# Patient Record
Sex: MALE | Race: WHITE | HISPANIC OR LATINO | Employment: UNEMPLOYED | URBAN - METROPOLITAN AREA
[De-identification: names, ages, dates, MRNs, and addresses within clinical notes are randomized per-mention and may not be internally consistent; named-entity substitution may affect disease eponyms.]

---

## 2023-03-28 ENCOUNTER — OFFICE VISIT (OUTPATIENT)
Dept: PRIMARY CARE | Facility: CLINIC | Age: 33
End: 2023-03-28
Payer: COMMERCIAL

## 2023-03-28 VITALS
TEMPERATURE: 97.4 F | SYSTOLIC BLOOD PRESSURE: 138 MMHG | WEIGHT: 315 LBS | HEART RATE: 70 BPM | OXYGEN SATURATION: 95 % | DIASTOLIC BLOOD PRESSURE: 98 MMHG | BODY MASS INDEX: 60.76 KG/M2 | RESPIRATION RATE: 20 BRPM

## 2023-03-28 DIAGNOSIS — Z00.00 ANNUAL PHYSICAL EXAM: Primary | ICD-10-CM

## 2023-03-28 DIAGNOSIS — R05.3 CHRONIC COUGH: ICD-10-CM

## 2023-03-28 DIAGNOSIS — E66.01 MORBID OBESITY (MULTI): Chronic | ICD-10-CM

## 2023-03-28 DIAGNOSIS — M51.26 HERNIATION OF INTERVERTEBRAL DISC OF LUMBAR SPINE: ICD-10-CM

## 2023-03-28 DIAGNOSIS — Z23 NEED FOR HPV VACCINE: ICD-10-CM

## 2023-03-28 DIAGNOSIS — G47.33 OSA (OBSTRUCTIVE SLEEP APNEA): ICD-10-CM

## 2023-03-28 DIAGNOSIS — Z23 NEED FOR TDAP VACCINATION: ICD-10-CM

## 2023-03-28 PROBLEM — G57.11 MERALGIA PARESTHETICA OF RIGHT SIDE: Status: ACTIVE | Noted: 2023-03-28

## 2023-03-28 PROBLEM — Z78.9 TRANSGENDER: Status: ACTIVE | Noted: 2023-03-28

## 2023-03-28 PROBLEM — M54.50 LOW BACK PAIN: Status: ACTIVE | Noted: 2023-03-28

## 2023-03-28 PROCEDURE — 99395 PREV VISIT EST AGE 18-39: CPT | Performed by: STUDENT IN AN ORGANIZED HEALTH CARE EDUCATION/TRAINING PROGRAM

## 2023-03-28 PROCEDURE — 1036F TOBACCO NON-USER: CPT | Performed by: STUDENT IN AN ORGANIZED HEALTH CARE EDUCATION/TRAINING PROGRAM

## 2023-03-28 RX ORDER — LORATADINE 10 MG/1
10 TABLET ORAL DAILY
Qty: 30 TABLET | Refills: 11 | Status: SHIPPED | OUTPATIENT
Start: 2023-03-28 | End: 2023-05-09 | Stop reason: ALTCHOICE

## 2023-03-28 RX ORDER — TRIAMCINOLONE ACETONIDE 55 UG/1
2 SPRAY, METERED NASAL DAILY
Qty: 16.5 G | Refills: 11 | Status: SHIPPED | OUTPATIENT
Start: 2023-03-28 | End: 2023-05-09 | Stop reason: ALTCHOICE

## 2023-03-28 RX ORDER — FERROUS SULFATE 325(65) MG
1 TABLET ORAL EVERY OTHER DAY
COMMUNITY
Start: 2023-03-15

## 2023-03-28 RX ORDER — ESTRADIOL 2 MG/1
2 TABLET ORAL 2 TIMES DAILY
COMMUNITY
Start: 2023-03-17

## 2023-03-28 RX ORDER — SPIRONOLACTONE 50 MG/1
50 TABLET, FILM COATED ORAL 2 TIMES DAILY
COMMUNITY
Start: 2023-03-07

## 2023-03-28 RX ORDER — CHOLECALCIFEROL (VITAMIN D3) 25 MCG
25 TABLET ORAL DAILY
COMMUNITY
Start: 2023-02-10

## 2023-03-28 SDOH — ECONOMIC STABILITY: FOOD INSECURITY: WITHIN THE PAST 12 MONTHS, YOU WORRIED THAT YOUR FOOD WOULD RUN OUT BEFORE YOU GOT MONEY TO BUY MORE.: NEVER TRUE

## 2023-03-28 SDOH — ECONOMIC STABILITY: HOUSING INSECURITY
IN THE LAST 12 MONTHS, WAS THERE A TIME WHEN YOU DID NOT HAVE A STEADY PLACE TO SLEEP OR SLEPT IN A SHELTER (INCLUDING NOW)?: NO

## 2023-03-28 SDOH — ECONOMIC STABILITY: TRANSPORTATION INSECURITY
IN THE PAST 12 MONTHS, HAS LACK OF TRANSPORTATION KEPT YOU FROM MEETINGS, WORK, OR FROM GETTING THINGS NEEDED FOR DAILY LIVING?: YES

## 2023-03-28 SDOH — HEALTH STABILITY: PHYSICAL HEALTH: ON AVERAGE, HOW MANY MINUTES DO YOU ENGAGE IN EXERCISE AT THIS LEVEL?: 30 MIN

## 2023-03-28 SDOH — ECONOMIC STABILITY: INCOME INSECURITY: IN THE LAST 12 MONTHS, WAS THERE A TIME WHEN YOU WERE NOT ABLE TO PAY THE MORTGAGE OR RENT ON TIME?: NO

## 2023-03-28 SDOH — HEALTH STABILITY: PHYSICAL HEALTH: ON AVERAGE, HOW MANY DAYS PER WEEK DO YOU ENGAGE IN MODERATE TO STRENUOUS EXERCISE (LIKE A BRISK WALK)?: 3 DAYS

## 2023-03-28 SDOH — ECONOMIC STABILITY: FOOD INSECURITY: WITHIN THE PAST 12 MONTHS, THE FOOD YOU BOUGHT JUST DIDN'T LAST AND YOU DIDN'T HAVE MONEY TO GET MORE.: NEVER TRUE

## 2023-03-28 SDOH — ECONOMIC STABILITY: TRANSPORTATION INSECURITY
IN THE PAST 12 MONTHS, HAS THE LACK OF TRANSPORTATION KEPT YOU FROM MEDICAL APPOINTMENTS OR FROM GETTING MEDICATIONS?: YES

## 2023-03-28 ASSESSMENT — ENCOUNTER SYMPTOMS
LOSS OF SENSATION IN FEET: 0
OCCASIONAL FEELINGS OF UNSTEADINESS: 0
NERVOUS/ANXIOUS: 0
DIARRHEA: 0
UNEXPECTED WEIGHT CHANGE: 0
WEAKNESS: 0
DEPRESSION: 0
APPETITE CHANGE: 0
BACK PAIN: 1
CONSTIPATION: 0
SLEEP DISTURBANCE: 0
FREQUENCY: 0
DIFFICULTY URINATING: 0
SHORTNESS OF BREATH: 0
NUMBNESS: 1
DECREASED CONCENTRATION: 0
HYPERACTIVE: 0
FATIGUE: 0
FEVER: 0
DYSPHORIC MOOD: 0

## 2023-03-28 ASSESSMENT — LIFESTYLE VARIABLES
AUDIT-C TOTAL SCORE: 0
HOW OFTEN DO YOU HAVE SIX OR MORE DRINKS ON ONE OCCASION: NEVER
HOW OFTEN DO YOU HAVE A DRINK CONTAINING ALCOHOL: NEVER
HOW MANY STANDARD DRINKS CONTAINING ALCOHOL DO YOU HAVE ON A TYPICAL DAY: PATIENT DOES NOT DRINK
SKIP TO QUESTIONS 9-10: 1

## 2023-03-28 ASSESSMENT — PATIENT HEALTH QUESTIONNAIRE - PHQ9
SUM OF ALL RESPONSES TO PHQ9 QUESTIONS 1 & 2: 0
2. FEELING DOWN, DEPRESSED OR HOPELESS: NOT AT ALL
1. LITTLE INTEREST OR PLEASURE IN DOING THINGS: NOT AT ALL

## 2023-03-28 ASSESSMENT — SOCIAL DETERMINANTS OF HEALTH (SDOH)
WITHIN THE LAST YEAR, HAVE YOU BEEN AFRAID OF YOUR PARTNER OR EX-PARTNER?: NO
WITHIN THE LAST YEAR, HAVE YOU BEEN HUMILIATED OR EMOTIONALLY ABUSED IN OTHER WAYS BY YOUR PARTNER OR EX-PARTNER?: NO
HOW HARD IS IT FOR YOU TO PAY FOR THE VERY BASICS LIKE FOOD, HOUSING, MEDICAL CARE, AND HEATING?: SOMEWHAT HARD
ARE YOU MARRIED, WIDOWED, DIVORCED, SEPARATED, NEVER MARRIED, OR LIVING WITH A PARTNER?: NEVER MARRIED
HOW OFTEN DO YOU ATTEND CHURCH OR RELIGIOUS SERVICES?: NEVER
HOW OFTEN DO YOU GET TOGETHER WITH FRIENDS OR RELATIVES?: THREE TIMES A WEEK
WITHIN THE LAST YEAR, HAVE TO BEEN RAPED OR FORCED TO HAVE ANY KIND OF SEXUAL ACTIVITY BY YOUR PARTNER OR EX-PARTNER?: NO
WITHIN THE LAST YEAR, HAVE YOU BEEN KICKED, HIT, SLAPPED, OR OTHERWISE PHYSICALLY HURT BY YOUR PARTNER OR EX-PARTNER?: NO
IN A TYPICAL WEEK, HOW MANY TIMES DO YOU TALK ON THE PHONE WITH FAMILY, FRIENDS, OR NEIGHBORS?: THREE TIMES A WEEK
DO YOU BELONG TO ANY CLUBS OR ORGANIZATIONS SUCH AS CHURCH GROUPS UNIONS, FRATERNAL OR ATHLETIC GROUPS, OR SCHOOL GROUPS?: NO
HOW OFTEN DO YOU ATTENT MEETINGS OF THE CLUB OR ORGANIZATION YOU BELONG TO?: NEVER

## 2023-03-28 NOTE — ASSESSMENT & PLAN NOTE
Discussed extensively the need for weight loss and potential strategies to achieve that goal.  Unclear what maximum weight was, but has lost at least 8 pounds.  Recommend to continue tracking calories and limit to under 2000/day.  Gave activity recommendations.  Follow-up in 3 months.

## 2023-03-28 NOTE — PROGRESS NOTES
Subjective   Nic Horowitz is a 32 y.o. male who is here for a routine exam.    Gained weight after last visit in Nov 2022. Has lost about 20lb in the past 3 weeks intentionally.   Has sleep study pending in 2 weeks.   Has not worked since October 2021 due to back pain. Has not gotten MRI of L spine for herniated disc yet.   Sees a Psychiatrist. Was on medications for Bipolar disorder, stopped a few months ago due to feeling more tired and  having difficulty concentration on the medications.   Also has started seeing a provider for hormone therapy. Born as a male but identifies as a female. Feels satisfied with hormone therapy and feels empowered to take initiative with other health issues now.    Active Problem List      Comprehensive Medical/Surgical/Social/Family History  Past Medical History:   Diagnosis Date    Bariatric surgery status 11/08/2018    Bariatric surgery status    Bipolar disorder, unspecified (CMS/[facility]) 10/07/2021    Bipolar 1 disorder    Essential (primary) hypertension 12/21/2018    Elevated blood pressure reading in office with white coat syndrome, with diagnosis of hypertension    Morbid (severe) obesity due to excess calories (CMS/[facility]) 11/10/2021    Morbid obesity    Morbid (severe) obesity due to excess calories (CMS/[facility]) 11/10/2021    Morbid obesity    Other forms of dyspnea 12/06/2018    Exertional dyspnea    Other specified health status 10/19/2018    Known health problems: none    Other symptoms and signs involving the nervous system 10/19/2018    Suspected sleep apnea    Vitamin deficiency, unspecified 10/19/2018    Vitamin deficiency     Past Surgical History:   Procedure Laterality Date    OTHER SURGICAL HISTORY  02/18/2020    Oral surgery     Social History     Social History Narrative    Not on file         Allergies and Medications  Patient has no known allergies.  Current Outpatient Medications on File Prior to Visit   Medication Sig Dispense Refill    cholecalciferol (Vitamin  D-3) 25 MCG (1000 UT) tablet Take 1 tablet (25 mcg) by mouth once daily.      estradiol (Estrace) 2 mg tablet Take 1 tablet (2 mg) by mouth in the morning and 1 tablet (2 mg) before bedtime.      ferrous sulfate 325 (65 Fe) MG tablet Take 1 tablet (325 mg) by mouth every other day.      spironolactone (Aldactone) 50 mg tablet Take 1 tablet (50 mg) by mouth in the morning and 1 tablet (50 mg) before bedtime.       No current facility-administered medications on file prior to visit.       Cancer Screening:  Colon Cancer Screening: not indicated  Prostate Cancer Risk: screening not indicated    Immunizations: Last Tdap in 2012. Didn't get flu shot this season. Has gotten initial COVID vaccine, but not boosters.    Preventative: Doesn't see a dentist regularly, last visit July 2022. Brushes twice a day and flosses daily. Doesn't see an eye doctor regularly, needs new glasses.  Might have gotten checked for Hep C and and HIV about 6 years.   Not sexually active.    Diet: Has been been cutting back on fat and calories. Is trying to add vegetables with meals. Prepares food more often for himself, mom used to prepare food before. Uses [employer] charlette occasionally. Has about 2000 calories logged per day.   Exercise: Has been walking more. Walks indoors inside the house. Tries to go for 20-30 minutes per day.      New concerns:  Has an ongoing cough since December 2022. Was sick when it started. Usually dry but sometimes productive of clear sputum. No fevers, rhinitis, sneezing, sore throat, or watery eyes. Not improved with Mucinex. Has seasonal allergies, takes antihistamine and Flonase. Denies heartburn.     Still has ongoing intermittent pain and pins/needles sensation in R leg. Radiates from lower back. Pain gets up to 6/10 on most days. Exacerbated by walking and lifting objects. Cannot sit or stand for prolonged periods. Not taking any medications. Not having saddle anesthesia or bowel/bladder incontinence or retention.      Review of Systems   Constitutional:  Negative for appetite change, fatigue, fever and unexpected weight change.   Respiratory:  Negative for shortness of breath.    Cardiovascular:  Negative for chest pain.   Gastrointestinal:  Negative for constipation and diarrhea.   Genitourinary:  Negative for decreased urine volume, difficulty urinating, frequency and urgency.   Musculoskeletal:  Positive for back pain.   Neurological:  Positive for numbness. Negative for weakness.   Psychiatric/Behavioral:  Negative for decreased concentration, dysphoric mood, sleep disturbance and suicidal ideas. The patient is not nervous/anxious and is not hyperactive.        Objective   BP (!) 138/98   Pulse 70   Temp 36.3 °C (97.4 °F) (Temporal)   Resp 20   Wt (!) 203 kg (448 lb)   SpO2 95%   BMI 60.76 kg/m²   Constitutional: Well developed, awake, alert, oriented x3  Head and Face: NCAT  Eyes: Normal external exam, EOMI  ENT: Normal external inspection of ears and nose. Tympanic membranes clear. Oropharynx normal.  Cardiovascular: RRR, S1/S2, no murmurs, rubs, or gallops, radial pulses +2, no edema of extremities  Pulmonary: CTAB, no respiratory distress.  Abdomen: obese, soft, non-tender, nondistended, no guarding or rebound, no masses noted  MSK: R lumbar paraspinal tenderness. Decreased lumbar flexion. Negative SLR b/l. LE strength 5/5 b/l.  Neuro: Decreased sensation to light touch of R LE.  Skin: No lesions, contusions, or erythema.  Psychiatric: Judgment intact. Appropriate mood and behavior    Assessment/Plan   Encounter Diagnoses   Name Primary?    Annual physical exam Yes    Need for HPV vaccine     Need for Tdap vaccination     Herniation of intervertebral disc of lumbar spine     Chronic cough     Morbid obesity (CMS/HCC)     CHA (obstructive sleep apnea)      CHA (obstructive sleep apnea)  Has known CHA but requires updated sleep study in order to order CPAP therapy.  Sleep study is pending within the next 2 weeks.   Follow-up to be determined based on results.    Herniation of intervertebral disc of lumbar spine  Lower back pain radiating down the right leg with sensory deficits for the past 6 months.  Deficits are not worsening, strength in the lower extremity has improved.  No active saddle anesthesia, bowel or bladder incontinence/retention, or midline tenderness.  MRI is still pending.  Reordered MRI.  Recommended weight loss.  Patient also seeking note stating that he is not able to work.  Refer to OT for functional capacity evaluation.  Follow-up to be determined based on MRI results.    Morbid obesity (CMS/HCC)  Discussed extensively the need for weight loss and potential strategies to achieve that goal.  Unclear what maximum weight was, but has lost at least 8 pounds.  Recommend to continue tracking calories and limit to under 2000/day.  Gave activity recommendations.  Follow-up in 3 months.    Chronic cough  Likely related to worsening control of allergic rhinitis. Will switch from Zyrtec and Flonase to Claritin and Nasacort. Follow up in 1 month if not improving.      Reviewed Social Determinants of health with patient, discussed healthy lifestyle including 150 minutes of physical activity per week. Recommended at least 2 days of muscle-strengthening activity per week.    Reviewed routine labs. Had some done in 11/2022. Will order all routine labs at next visit in 3 months.   Immunizations Not Up-to-Date. Needs Tdap. Desires HPV. Due to insurance requirements, needs to go to [facility] for vaccines.     Follow up in 3 months.

## 2023-03-28 NOTE — PROGRESS NOTES
Darwin Horowitz is a 32 y.o. male who is here for a routine exam.  Active Problem List      Comprehensive Medical/Surgical/Social/Family History  Past Medical History:   Diagnosis Date    Bariatric surgery status 11/08/2018    Bariatric surgery status    Bipolar disorder, unspecified (CMS/[facility]) 10/07/2021    Bipolar 1 disorder    Essential (primary) hypertension 12/21/2018    Elevated blood pressure reading in office with white coat syndrome, with diagnosis of hypertension    Morbid (severe) obesity due to excess calories (CMS/[facility]) 11/10/2021    Morbid obesity    Morbid (severe) obesity due to excess calories (CMS/[facility]) 11/10/2021    Morbid obesity    Other forms of dyspnea 12/06/2018    Exertional dyspnea    Other specified health status 10/19/2018    Known health problems: none    Other symptoms and signs involving the nervous system 10/19/2018    Suspected sleep apnea    Vitamin deficiency, unspecified 10/19/2018    Vitamin deficiency     Past Surgical History:   Procedure Laterality Date    OTHER SURGICAL HISTORY  02/18/2020    Oral surgery     Social History     Social History Narrative    Not on file         Allergies and Medications  Patient has no allergy information on record.  No current outpatient medications on file prior to visit.     No current facility-administered medications on file prior to visit.       New concerns:  ***    Review of Systems    Objective   There were no vitals taken for this visit.    Physical Exam    Assessment/Plan   Problem List Items Addressed This Visit    None      Reviewed Social Determinants of health with patient, discussed healthy lifestyle including 150 minutes of physical activity per week  Ordered/Reviewed baseline labwork -CBC, CMP, Lipid Panel  Immunizations Up-to-Date

## 2023-03-28 NOTE — ASSESSMENT & PLAN NOTE
Likely related to worsening control of allergic rhinitis. Will switch from Zyrtec and Flonase to Claritin and Nasacort. Follow up in 1 month if not improving.

## 2023-03-28 NOTE — ASSESSMENT & PLAN NOTE
Has known CHA but requires updated sleep study in order to order CPAP therapy.  Sleep study is pending within the next 2 weeks.  Follow-up to be determined based on results.

## 2023-03-28 NOTE — ASSESSMENT & PLAN NOTE
Lower back pain radiating down the right leg with sensory deficits for the past 6 months.  Deficits are not worsening, strength in the lower extremity has improved.  No active saddle anesthesia, bowel or bladder incontinence/retention, or midline tenderness.  MRI is still pending.  Reordered MRI.  Recommended weight loss.  Patient also seeking note stating that he is not able to work.  Refer to OT for functional capacity evaluation.  Follow-up to be determined based on MRI results.

## 2023-05-09 ENCOUNTER — OFFICE VISIT (OUTPATIENT)
Dept: PRIMARY CARE | Facility: CLINIC | Age: 33
End: 2023-05-09
Payer: COMMERCIAL

## 2023-05-09 VITALS
TEMPERATURE: 96.8 F | WEIGHT: 315 LBS | BODY MASS INDEX: 58.77 KG/M2 | DIASTOLIC BLOOD PRESSURE: 80 MMHG | RESPIRATION RATE: 16 BRPM | HEART RATE: 80 BPM | SYSTOLIC BLOOD PRESSURE: 134 MMHG

## 2023-05-09 DIAGNOSIS — R03.0 WHITE COAT SYNDROME WITHOUT DIAGNOSIS OF HYPERTENSION: Primary | ICD-10-CM

## 2023-05-09 DIAGNOSIS — M51.26 HERNIATION OF INTERVERTEBRAL DISC OF LUMBAR SPINE: ICD-10-CM

## 2023-05-09 PROBLEM — F60.3 BORDERLINE PERSONALITY DISORDER (MULTI): Status: ACTIVE | Noted: 2017-08-14

## 2023-05-09 PROBLEM — G43.909 MIGRAINES: Status: ACTIVE | Noted: 2023-05-09

## 2023-05-09 PROBLEM — Z98.84 BARIATRIC SURGERY STATUS: Status: RESOLVED | Noted: 2023-05-09 | Resolved: 2023-05-09

## 2023-05-09 PROBLEM — Z98.84 BARIATRIC SURGERY STATUS: Status: ACTIVE | Noted: 2023-05-09

## 2023-05-09 PROBLEM — M54.32 BILATERAL SCIATICA: Status: ACTIVE | Noted: 2023-05-09

## 2023-05-09 PROBLEM — F41.1 GAD (GENERALIZED ANXIETY DISORDER): Status: ACTIVE | Noted: 2017-08-14

## 2023-05-09 PROBLEM — M54.31 BILATERAL SCIATICA: Status: ACTIVE | Noted: 2023-05-09

## 2023-05-09 PROBLEM — R29.818 SUSPECTED SLEEP APNEA: Status: ACTIVE | Noted: 2023-05-09

## 2023-05-09 PROCEDURE — 99213 OFFICE O/P EST LOW 20 MIN: CPT | Performed by: NURSE PRACTITIONER

## 2023-05-09 PROCEDURE — 3075F SYST BP GE 130 - 139MM HG: CPT | Performed by: NURSE PRACTITIONER

## 2023-05-09 PROCEDURE — 1036F TOBACCO NON-USER: CPT | Performed by: NURSE PRACTITIONER

## 2023-05-09 PROCEDURE — 3079F DIAST BP 80-89 MM HG: CPT | Performed by: NURSE PRACTITIONER

## 2023-05-09 RX ORDER — FLUTICASONE PROPIONATE 50 MCG
1 SPRAY, SUSPENSION (ML) NASAL
COMMUNITY

## 2023-05-09 RX ORDER — FLUTICASONE PROPIONATE 50 MCG
1 SPRAY, SUSPENSION (ML) NASAL
COMMUNITY
End: 2023-05-09 | Stop reason: ALTCHOICE

## 2023-05-09 NOTE — PROGRESS NOTES
"Subjective   Patient ID: Nic Horowitz is a 32 y.o. adult who presents for Establish Care.    Pt here to establish care        She is feeling well overall. Has been seeing Dr. Hyde as PCP. Would like to establish care at [facility].     Has concerns for back pain beginning around 2021.   She had xrays done, which demonstrated bilateral L5, S1 spondylolysis.   She completed physical therapy for this.   MRI was ordered, but she has not had imaging done yet.   She is experiencing numbness in right leg and occasional weakness.  Sometimes feels unsteady when she stands on it.  Pain is described as constant, dull, aching pain.   Occasional sharp pains radiate down the right leg.    \"Almost always numbness/tingling\". This has been occurring for more than one year.    She saw a spine specialist orthopedic provider and PCP Dr. Hyde for this pain. No recent follow up.   History of falls at home.    She has tried OTC medications and Meloxicam, but nothing helped much.    Current pain 5-6/10.  At its worst 8/10.      She checks BP periodically at home.  Usual levels 120s/70s.    No chest pain or heart palpitations.      Review of Systems   All other systems reviewed and are negative.      Objective   /80   Pulse 80   Temp 36 °C (96.8 °F)   Resp 16   Wt (!) 197 kg (433 lb 4.8 oz)   BMI 58.77 kg/m²     Physical Exam  Vitals reviewed.   Constitutional:       Appearance: Normal appearance.   Cardiovascular:      Rate and Rhythm: Normal rate and regular rhythm.      Heart sounds: Normal heart sounds.   Pulmonary:      Effort: Pulmonary effort is normal.      Breath sounds: Normal breath sounds.   Musculoskeletal:         General: Normal range of motion.      Comments: Upper and lower extremity strength equal bilaterally.  Mild midline low back pain reproducible with palpation.    Skin:     General: Skin is warm and dry.   Neurological:      General: No focal deficit present.      Mental Status: She is alert. " Mental status is at baseline.   Psychiatric:         Mood and Affect: Mood normal.         Behavior: Behavior normal.         Thought Content: Thought content normal.         Judgment: Judgment normal.         Assessment/Plan   Problem List Items Addressed This Visit          Circulatory    White coat syndrome without diagnosis of hypertension - Primary     Home BP levels within goal. No medication recommended at this time.             Musculoskeletal    Herniation of intervertebral disc of lumbar spine     MRI order in place from PCP. Reviewed conservative recommendations for management of low back pain including stretches, PT, and NSAIDs PRN. Follow up with ortho if symptoms persist or as needed.   Follow up for physical when applicable.

## 2023-05-17 NOTE — ASSESSMENT & PLAN NOTE
MRI order in place from PCP. Reviewed conservative recommendations for management of low back pain including stretches, PT, and NSAIDs PRN. Follow up with ortho if symptoms persist or as needed.   Follow up for physical when applicable.

## 2025-02-07 ENCOUNTER — PATIENT OUTREACH (OUTPATIENT)
Dept: CARE COORDINATION | Facility: CLINIC | Age: 35
End: 2025-02-07
Payer: COMMERCIAL

## 2025-02-07 SDOH — ECONOMIC STABILITY: GENERAL: WOULD YOU LIKE HELP WITH ANY OF THE FOLLOWING NEEDS?: I DONT NEED HELP WITH ANY OF THESE

## 2025-02-07 SDOH — ECONOMIC STABILITY: FOOD INSECURITY
ARE ANY OF YOUR NEEDS URGENT? FOR EXAMPLE, UNCERTAINTY OF WHERE YOU WILL GET YOUR NEXT MEAL OR NOT HAVING THE MEDICATIONS YOU NEED TO TAKE TOMORROW.: NO

## 2025-02-07 NOTE — PROGRESS NOTES
Outreach call to support smooth transition from most recent admission. Pt was seen at [facility] recently.  Spoke with Pt, reviewed discharge meds/patient has meds, assessed social needs/patient working with [facility]/no other needs mentioned at thist time, provided education on the importance of follow up apts/patient has upcoming apts at [facility] and [facility]-for .    .Medications  Medications reviewed with patient/caregiver?: Yes (2/7/2025  7:57 AM)  Is the patient having any side effects they believe may be caused by any medication additions or changes?: No (2/7/2025  7:57 AM)  Does the patient have all medications ordered at discharge?: Not applicable (2/7/2025  7:57 AM)  Care Management Interventions: No intervention needed (2/7/2025  7:57 AM)    Appointments  Does the patient have a primary care provider?: Yes (2/7/2025  7:57 AM)  Care Management Interventions: Educated patient on importance of making appointment (2/7/2025  7:57 AM)  Has the patient kept scheduled appointments due by today?: Not applicable (2/7/2025  7:57 AM)    Patient Teaching  Does the patient have access to their discharge instructions?: Yes (2/7/2025  7:57 AM)  Care Management Interventions: Reviewed instructions with patient; Educated on MyChart (2/7/2025  7:57 AM)  What is the patient's perception of their health status since discharge?: Improving (2/7/2025  7:57 AM)  Is the patient/caregiver able to teach back the hierarchy of who to call/visit for symptoms/problems? PCP, Specialist, Home Health nurse, Urgent Care, ED, 911: Yes (2/7/2025  7:57 AM)    Johanna PATRICK LSW

## 2025-02-12 ENCOUNTER — APPOINTMENT (OUTPATIENT)
Dept: CARDIOLOGY | Facility: HOSPITAL | Age: 35
End: 2025-02-12

## 2025-02-12 ENCOUNTER — HOSPITAL ENCOUNTER (INPATIENT)
Facility: HOSPITAL | Age: 35
LOS: 1 days | Discharge: ANOTHER HOSPITAL | End: 2025-02-13
Attending: STUDENT IN AN ORGANIZED HEALTH CARE EDUCATION/TRAINING PROGRAM | Admitting: STUDENT IN AN ORGANIZED HEALTH CARE EDUCATION/TRAINING PROGRAM

## 2025-02-12 DIAGNOSIS — T39.1X2A: ICD-10-CM

## 2025-02-12 DIAGNOSIS — T39.1X2A INTENTIONAL ACETAMINOPHEN OVERDOSE, INITIAL ENCOUNTER (MULTI): Primary | ICD-10-CM

## 2025-02-12 LAB
ALBUMIN SERPL BCP-MCNC: 3.8 G/DL (ref 3.4–5)
ALP SERPL-CCNC: 56 U/L (ref 33–120)
ALT SERPL W P-5'-P-CCNC: 30 U/L (ref 10–52)
AMPHETAMINES UR QL SCN: NORMAL
ANION GAP SERPL CALC-SCNC: 11 MMOL/L (ref 10–20)
APAP SERPL-MCNC: 126.8 UG/ML
APAP SERPL-MCNC: 126.8 UG/ML
APAP SERPL-MCNC: 132.5 UG/ML
AST SERPL W P-5'-P-CCNC: 20 U/L (ref 9–39)
BARBITURATES UR QL SCN: NORMAL
BASOPHILS # BLD AUTO: 0.05 X10*3/UL (ref 0–0.1)
BASOPHILS NFR BLD AUTO: 0.5 %
BENZODIAZ UR QL SCN: NORMAL
BILIRUB SERPL-MCNC: 0.5 MG/DL (ref 0–1.2)
BUN SERPL-MCNC: 10 MG/DL (ref 6–23)
BZE UR QL SCN: NORMAL
CALCIUM SERPL-MCNC: 8.8 MG/DL (ref 8.6–10.3)
CANNABINOIDS UR QL SCN: NORMAL
CHLORIDE SERPL-SCNC: 101 MMOL/L (ref 98–107)
CO2 SERPL-SCNC: 26 MMOL/L (ref 21–32)
CREAT SERPL-MCNC: 1.03 MG/DL (ref 0.5–1.3)
EGFRCR SERPLBLD CKD-EPI 2021: >90 ML/MIN/1.73M*2
EOSINOPHIL # BLD AUTO: 0.53 X10*3/UL (ref 0–0.7)
EOSINOPHIL NFR BLD AUTO: 5.3 %
ERYTHROCYTE [DISTWIDTH] IN BLOOD BY AUTOMATED COUNT: 14.3 % (ref 11.5–14.5)
ETHANOL SERPL-MCNC: <10 MG/DL
FENTANYL+NORFENTANYL UR QL SCN: NORMAL
GLUCOSE SERPL-MCNC: 139 MG/DL (ref 74–99)
HCT VFR BLD AUTO: 42.4 % (ref 41–52)
HGB BLD-MCNC: 12.5 G/DL (ref 13.5–17.5)
HOLD SPECIMEN: NORMAL
HOLD SPECIMEN: NORMAL
IMM GRANULOCYTES # BLD AUTO: 0.02 X10*3/UL (ref 0–0.7)
IMM GRANULOCYTES NFR BLD AUTO: 0.2 % (ref 0–0.9)
INR PPP: 1.1 (ref 0.9–1.1)
LYMPHOCYTES # BLD AUTO: 2.23 X10*3/UL (ref 1.2–4.8)
LYMPHOCYTES NFR BLD AUTO: 22.3 %
MCH RBC QN AUTO: 22.8 PG (ref 26–34)
MCHC RBC AUTO-ENTMCNC: 29.5 G/DL (ref 32–36)
MCV RBC AUTO: 77 FL (ref 80–100)
METHADONE UR QL SCN: NORMAL
MONOCYTES # BLD AUTO: 0.84 X10*3/UL (ref 0.1–1)
MONOCYTES NFR BLD AUTO: 8.4 %
NEUTROPHILS # BLD AUTO: 6.33 X10*3/UL (ref 1.2–7.7)
NEUTROPHILS NFR BLD AUTO: 63.3 %
NRBC BLD-RTO: 0 /100 WBCS (ref 0–0)
OPIATES UR QL SCN: NORMAL
OXYCODONE+OXYMORPHONE UR QL SCN: NORMAL
PCP UR QL SCN: NORMAL
PLATELET # BLD AUTO: 236 X10*3/UL (ref 150–450)
POTASSIUM SERPL-SCNC: 3.7 MMOL/L (ref 3.5–5.3)
PROT SERPL-MCNC: 8.1 G/DL (ref 6.4–8.2)
PROTHROMBIN TIME: 12.9 SECONDS (ref 9.8–12.8)
RBC # BLD AUTO: 5.49 X10*6/UL (ref 4.5–5.9)
SALICYLATES SERPL-MCNC: <3 MG/DL
SODIUM SERPL-SCNC: 134 MMOL/L (ref 136–145)
WBC # BLD AUTO: 10 X10*3/UL (ref 4.4–11.3)

## 2025-02-12 PROCEDURE — 96366 THER/PROPH/DIAG IV INF ADDON: CPT

## 2025-02-12 PROCEDURE — 80143 DRUG ASSAY ACETAMINOPHEN: CPT

## 2025-02-12 PROCEDURE — 80179 DRUG ASSAY SALICYLATE: CPT

## 2025-02-12 PROCEDURE — 36415 COLL VENOUS BLD VENIPUNCTURE: CPT

## 2025-02-12 PROCEDURE — 80143 DRUG ASSAY ACETAMINOPHEN: CPT | Performed by: EMERGENCY MEDICINE

## 2025-02-12 PROCEDURE — 85025 COMPLETE CBC W/AUTO DIFF WBC: CPT | Performed by: EMERGENCY MEDICINE

## 2025-02-12 PROCEDURE — 36415 COLL VENOUS BLD VENIPUNCTURE: CPT | Performed by: EMERGENCY MEDICINE

## 2025-02-12 PROCEDURE — 96365 THER/PROPH/DIAG IV INF INIT: CPT

## 2025-02-12 PROCEDURE — 93005 ELECTROCARDIOGRAM TRACING: CPT

## 2025-02-12 PROCEDURE — 85610 PROTHROMBIN TIME: CPT

## 2025-02-12 PROCEDURE — 2500000001 HC RX 250 WO HCPCS SELF ADMINISTERED DRUGS (ALT 637 FOR MEDICARE OP)

## 2025-02-12 PROCEDURE — 2500000004 HC RX 250 GENERAL PHARMACY W/ HCPCS (ALT 636 FOR OP/ED)

## 2025-02-12 PROCEDURE — 80307 DRUG TEST PRSMV CHEM ANLYZR: CPT

## 2025-02-12 PROCEDURE — 80053 COMPREHEN METABOLIC PANEL: CPT | Performed by: EMERGENCY MEDICINE

## 2025-02-12 PROCEDURE — 99291 CRITICAL CARE FIRST HOUR: CPT

## 2025-02-12 RX ADMIN — ACETYLCYSTEINE 15 G: 200 INJECTION, SOLUTION INTRAVENOUS at 21:08

## 2025-02-12 RX ADMIN — ACETYLCYSTEINE 5 G: 200 INJECTION, SOLUTION INTRAVENOUS at 22:18

## 2025-02-12 RX ADMIN — ACTIVATED CHARCOAL 50 G: 208 SUSPENSION ORAL at 20:21

## 2025-02-12 SDOH — HEALTH STABILITY: MENTAL HEALTH: NEEDS EXPRESSED: DENIES

## 2025-02-12 SDOH — HEALTH STABILITY: MENTAL HEALTH: BEHAVIORAL HEALTH(WDL): EXCEPTIONS TO WDL

## 2025-02-12 SDOH — HEALTH STABILITY: MENTAL HEALTH
HAVE YOU STARTED TO WORK OUT OR WORKED OUT THE DETAILS OF HOW TO KILL YOURSELF? DO YOU INTENT TO CARRY OUT THIS PLAN?: YES

## 2025-02-12 SDOH — HEALTH STABILITY: MENTAL HEALTH

## 2025-02-12 SDOH — HEALTH STABILITY: MENTAL HEALTH: HAVE YOU HAD THESE THOUGHTS AND HAD SOME INTENTION OF ACTING ON THEM?: YES

## 2025-02-12 SDOH — HEALTH STABILITY: MENTAL HEALTH: FOR HIGH RISK PATIENTS: ALL INTERVENTIONS ABOVE, PLUS:;1:1 PATIENT OBSERVER AT ALL TIMES

## 2025-02-12 SDOH — HEALTH STABILITY: MENTAL HEALTH: BEHAVIORS/MOOD: CALM;COOPERATIVE

## 2025-02-12 SDOH — HEALTH STABILITY: MENTAL HEALTH: BEHAVIORS/MOOD: CALM

## 2025-02-12 SDOH — HEALTH STABILITY: MENTAL HEALTH: HAVE YOU ACTUALLY HAD ANY THOUGHTS OF KILLING YOURSELF?: YES

## 2025-02-12 SDOH — HEALTH STABILITY: MENTAL HEALTH: HAVE YOU WISHED YOU WERE DEAD OR WISHED YOU COULD GO TO SLEEP AND NOT WAKE UP?: YES

## 2025-02-12 SDOH — HEALTH STABILITY: MENTAL HEALTH: HAVE YOU BEEN THINKING ABOUT HOW YOU MIGHT DO THIS?: YES

## 2025-02-12 SDOH — HEALTH STABILITY: MENTAL HEALTH
OTHER SUICIDE PRECAUTIONS INCLUDE: PATIENT PLACED IN AN EASILY OBSERVABLE ROOM WITH DOOR/CURTAIN REMAINING OPEN;PATIENT PLACED IN GOWN (SNAPS OR PAPER GOWNS PREFERRED) AND WANDED;REMAINING RISKS IDENTIFIED AND MITIGATED;PATIENT PLACED IN PSYCH SAFE ROOM (IF AVAILABLE);PROVIDER NOTIFIED;FREQUENT ROUNDING WITH IRREGULAR CHECKS AT MINIMUM OF EVERY 15 MINUTES TO ASSESS PSYCH SAFETY PERFORMED;HOME MEDICATION LIST COLLECTED AND SHARED WITH PROVIDER

## 2025-02-12 SDOH — HEALTH STABILITY: MENTAL HEALTH: HAVE YOU EVER DONE ANYTHING, STARTED TO DO ANYTHING, OR PREPARED TO DO ANYTHING TO END YOUR LIFE?: YES

## 2025-02-12 SDOH — HEALTH STABILITY: MENTAL HEALTH: RISK OF SUICIDE: HIGH RISK

## 2025-02-12 SDOH — HEALTH STABILITY: MENTAL HEALTH: SUICIDE ASSESSMENT: ADULT (C-SSRS)

## 2025-02-12 SDOH — HEALTH STABILITY: MENTAL HEALTH: WAS THIS WITHIN THE PAST THREE MONTHS?: YES

## 2025-02-12 SDOH — HEALTH STABILITY: MENTAL HEALTH: SLEEP PATTERN: UNABLE TO ASSESS

## 2025-02-12 ASSESSMENT — PAIN SCALES - GENERAL
PAINLEVEL_OUTOF10: 0 - NO PAIN

## 2025-02-12 ASSESSMENT — COLUMBIA-SUICIDE SEVERITY RATING SCALE - C-SSRS
1. IN THE PAST MONTH, HAVE YOU WISHED YOU WERE DEAD OR WISHED YOU COULD GO TO SLEEP AND NOT WAKE UP?: YES
6. HAVE YOU EVER DONE ANYTHING, STARTED TO DO ANYTHING, OR PREPARED TO DO ANYTHING TO END YOUR LIFE?: YES
6. HAVE YOU EVER DONE ANYTHING, STARTED TO DO ANYTHING, OR PREPARED TO DO ANYTHING TO END YOUR LIFE?: YES
2. HAVE YOU ACTUALLY HAD ANY THOUGHTS OF KILLING YOURSELF?: YES
4. HAVE YOU HAD THESE THOUGHTS AND HAD SOME INTENTION OF ACTING ON THEM?: YES
5. HAVE YOU STARTED TO WORK OUT OR WORKED OUT THE DETAILS OF HOW TO KILL YOURSELF? DO YOU INTEND TO CARRY OUT THIS PLAN?: YES

## 2025-02-12 ASSESSMENT — LIFESTYLE VARIABLES
EVER HAD A DRINK FIRST THING IN THE MORNING TO STEADY YOUR NERVES TO GET RID OF A HANGOVER: NO
EVER FELT BAD OR GUILTY ABOUT YOUR DRINKING: NO
HAVE PEOPLE ANNOYED YOU BY CRITICIZING YOUR DRINKING: NO
HAVE YOU EVER FELT YOU SHOULD CUT DOWN ON YOUR DRINKING: NO
TOTAL SCORE: 0

## 2025-02-12 ASSESSMENT — PAIN - FUNCTIONAL ASSESSMENT: PAIN_FUNCTIONAL_ASSESSMENT: 0-10

## 2025-02-13 ENCOUNTER — HOSPITAL ENCOUNTER (INPATIENT)
Facility: HOSPITAL | Age: 35
End: 2025-02-13
Attending: INTERNAL MEDICINE | Admitting: NURSE PRACTITIONER

## 2025-02-13 ENCOUNTER — HOSPITAL ENCOUNTER (OUTPATIENT)
Dept: CARDIOLOGY | Facility: HOSPITAL | Age: 35
Discharge: HOME | End: 2025-02-13

## 2025-02-13 VITALS
RESPIRATION RATE: 22 BRPM | TEMPERATURE: 96.4 F | HEIGHT: 72 IN | HEART RATE: 66 BPM | OXYGEN SATURATION: 96 % | WEIGHT: 315 LBS | BODY MASS INDEX: 42.66 KG/M2 | SYSTOLIC BLOOD PRESSURE: 116 MMHG | DIASTOLIC BLOOD PRESSURE: 51 MMHG

## 2025-02-13 DIAGNOSIS — F31.9 BIPOLAR 1 DISORDER (MULTI): ICD-10-CM

## 2025-02-13 DIAGNOSIS — T39.1X4A ACETAMINOPHEN OVERDOSE OF UNDETERMINED INTENT, INITIAL ENCOUNTER: Primary | ICD-10-CM

## 2025-02-13 PROBLEM — T39.1X2A INTENTIONAL ACETAMINOPHEN OVERDOSE, INITIAL ENCOUNTER (MULTI): Status: ACTIVE | Noted: 2025-02-13

## 2025-02-13 LAB
ALBUMIN SERPL BCP-MCNC: 3.6 G/DL (ref 3.4–5)
ALBUMIN SERPL BCP-MCNC: 3.6 G/DL (ref 3.4–5)
ALP SERPL-CCNC: 41 U/L (ref 33–120)
ALP SERPL-CCNC: 42 U/L (ref 33–120)
ALT SERPL W P-5'-P-CCNC: 24 U/L (ref 10–52)
ALT SERPL W P-5'-P-CCNC: 26 U/L (ref 7–52)
ANION GAP SERPL CALC-SCNC: 12 MMOL/L (ref 10–20)
ANION GAP SERPL CALC-SCNC: 9 MMOL/L (ref 10–20)
APAP SERPL-MCNC: 10.5 UG/ML
APAP SERPL-MCNC: 46.1 UG/ML
APAP SERPL-MCNC: <10 UG/ML
APAP SERPL-MCNC: <10 UG/ML
AST SERPL W P-5'-P-CCNC: 13 U/L (ref 9–39)
AST SERPL W P-5'-P-CCNC: 15 U/L (ref 9–39)
BILIRUB DIRECT SERPL-MCNC: 0.1 MG/DL (ref 0–0.3)
BILIRUB SERPL-MCNC: 0.6 MG/DL (ref 0–1.2)
BILIRUB SERPL-MCNC: 0.6 MG/DL (ref 0–1.2)
BUN SERPL-MCNC: 8 MG/DL (ref 6–23)
BUN SERPL-MCNC: 8 MG/DL (ref 6–23)
CALCIUM SERPL-MCNC: 9.2 MG/DL (ref 8.6–10.3)
CALCIUM SERPL-MCNC: 9.4 MG/DL (ref 8.6–10.3)
CHLORIDE SERPL-SCNC: 102 MMOL/L (ref 98–107)
CHLORIDE SERPL-SCNC: 102 MMOL/L (ref 98–107)
CO2 SERPL-SCNC: 25 MMOL/L (ref 21–32)
CO2 SERPL-SCNC: 27 MMOL/L (ref 21–32)
CREAT SERPL-MCNC: 0.98 MG/DL (ref 0.5–1.3)
CREAT SERPL-MCNC: 0.99 MG/DL (ref 0.5–1.3)
EGFRCR SERPLBLD CKD-EPI 2021: >90 ML/MIN/1.73M*2
EGFRCR SERPLBLD CKD-EPI 2021: >90 ML/MIN/1.73M*2
ERYTHROCYTE [DISTWIDTH] IN BLOOD BY AUTOMATED COUNT: 14.6 % (ref 11.5–14.5)
GLUCOSE SERPL-MCNC: 117 MG/DL (ref 74–99)
GLUCOSE SERPL-MCNC: 120 MG/DL (ref 74–99)
HCT VFR BLD AUTO: 41.9 % (ref 36–52)
HGB BLD-MCNC: 12.7 G/DL (ref 12–17.5)
HOLD SPECIMEN: NORMAL
HOLD SPECIMEN: NORMAL
INR PPP: 1.3 (ref 0.9–1.1)
INR PPP: 1.3 (ref 0.9–1.1)
MCH RBC QN AUTO: 23.4 PG (ref 26–34)
MCHC RBC AUTO-ENTMCNC: 30.3 G/DL (ref 32–36)
MCV RBC AUTO: 77 FL (ref 80–100)
NRBC BLD-RTO: 0 /100 WBCS (ref 0–0)
PLATELET # BLD AUTO: 216 X10*3/UL (ref 150–450)
POTASSIUM SERPL-SCNC: 3.2 MMOL/L (ref 3.5–5.3)
POTASSIUM SERPL-SCNC: 3.5 MMOL/L (ref 3.5–5.3)
PROT SERPL-MCNC: 7.6 G/DL (ref 6.4–8.2)
PROT SERPL-MCNC: 7.8 G/DL (ref 6.4–8.2)
PROTHROMBIN TIME: 14.5 SECONDS (ref 9.8–12.8)
PROTHROMBIN TIME: 15.1 SECONDS (ref 9.8–12.8)
RBC # BLD AUTO: 5.43 X10*6/UL (ref 4–5.9)
SODIUM SERPL-SCNC: 135 MMOL/L (ref 136–145)
SODIUM SERPL-SCNC: 135 MMOL/L (ref 136–145)
WBC # BLD AUTO: 8.4 X10*3/UL (ref 4.4–11.3)

## 2025-02-13 PROCEDURE — 93005 ELECTROCARDIOGRAM TRACING: CPT

## 2025-02-13 PROCEDURE — 2020000001 HC ICU ROOM DAILY

## 2025-02-13 PROCEDURE — 99222 1ST HOSP IP/OBS MODERATE 55: CPT | Performed by: PSYCHIATRY & NEUROLOGY

## 2025-02-13 PROCEDURE — 80053 COMPREHEN METABOLIC PANEL: CPT | Performed by: STUDENT IN AN ORGANIZED HEALTH CARE EDUCATION/TRAINING PROGRAM

## 2025-02-13 PROCEDURE — 80143 DRUG ASSAY ACETAMINOPHEN: CPT

## 2025-02-13 PROCEDURE — 85610 PROTHROMBIN TIME: CPT

## 2025-02-13 PROCEDURE — 85027 COMPLETE CBC AUTOMATED: CPT

## 2025-02-13 PROCEDURE — 2060000001 HC INTERMEDIATE ICU ROOM DAILY

## 2025-02-13 PROCEDURE — 2500000002 HC RX 250 W HCPCS SELF ADMINISTERED DRUGS (ALT 637 FOR MEDICARE OP, ALT 636 FOR OP/ED)

## 2025-02-13 PROCEDURE — 80143 DRUG ASSAY ACETAMINOPHEN: CPT | Performed by: STUDENT IN AN ORGANIZED HEALTH CARE EDUCATION/TRAINING PROGRAM

## 2025-02-13 PROCEDURE — 85610 PROTHROMBIN TIME: CPT | Performed by: STUDENT IN AN ORGANIZED HEALTH CARE EDUCATION/TRAINING PROGRAM

## 2025-02-13 PROCEDURE — 36415 COLL VENOUS BLD VENIPUNCTURE: CPT

## 2025-02-13 PROCEDURE — 2500000004 HC RX 250 GENERAL PHARMACY W/ HCPCS (ALT 636 FOR OP/ED)

## 2025-02-13 PROCEDURE — 96366 THER/PROPH/DIAG IV INF ADDON: CPT

## 2025-02-13 PROCEDURE — G0378 HOSPITAL OBSERVATION PER HR: HCPCS

## 2025-02-13 PROCEDURE — 99221 1ST HOSP IP/OBS SF/LOW 40: CPT | Performed by: NURSE PRACTITIONER

## 2025-02-13 PROCEDURE — 82248 BILIRUBIN DIRECT: CPT

## 2025-02-13 PROCEDURE — 80053 COMPREHEN METABOLIC PANEL: CPT

## 2025-02-13 PROCEDURE — 36415 COLL VENOUS BLD VENIPUNCTURE: CPT | Performed by: STUDENT IN AN ORGANIZED HEALTH CARE EDUCATION/TRAINING PROGRAM

## 2025-02-13 PROCEDURE — 99291 CRITICAL CARE FIRST HOUR: CPT | Performed by: STUDENT IN AN ORGANIZED HEALTH CARE EDUCATION/TRAINING PROGRAM

## 2025-02-13 RX ORDER — HYDROXYZINE HYDROCHLORIDE 50 MG/1
50 TABLET, FILM COATED ORAL 3 TIMES DAILY PRN
COMMUNITY
End: 2025-02-17 | Stop reason: HOSPADM

## 2025-02-13 RX ORDER — DOXAZOSIN 1 MG/1
1 TABLET ORAL NIGHTLY
COMMUNITY

## 2025-02-13 RX ORDER — PRAZOSIN HYDROCHLORIDE 1 MG/1
1 CAPSULE ORAL NIGHTLY
Status: ON HOLD | COMMUNITY
End: 2025-02-14 | Stop reason: ALTCHOICE

## 2025-02-13 RX ORDER — ENOXAPARIN SODIUM 100 MG/ML
60 INJECTION SUBCUTANEOUS EVERY 12 HOURS SCHEDULED
Status: DISCONTINUED | OUTPATIENT
Start: 2025-02-13 | End: 2025-02-14

## 2025-02-13 RX ORDER — POTASSIUM CHLORIDE 1.5 G/1.58G
20 POWDER, FOR SOLUTION ORAL EVERY 6 HOURS PRN
Status: DISCONTINUED | OUTPATIENT
Start: 2025-02-13 | End: 2025-02-13 | Stop reason: HOSPADM

## 2025-02-13 RX ORDER — POTASSIUM CHLORIDE 20 MEQ/1
20 TABLET, EXTENDED RELEASE ORAL EVERY 6 HOURS PRN
Status: DISCONTINUED | OUTPATIENT
Start: 2025-02-13 | End: 2025-02-13 | Stop reason: HOSPADM

## 2025-02-13 RX ORDER — PANTOPRAZOLE SODIUM 40 MG/10ML
40 INJECTION, POWDER, LYOPHILIZED, FOR SOLUTION INTRAVENOUS DAILY
Status: DISCONTINUED | OUTPATIENT
Start: 2025-02-14 | End: 2025-02-16

## 2025-02-13 RX ORDER — MULTIVIT-MIN/IRON FUM/FOLIC AC 7.5 MG-4
1 TABLET ORAL DAILY
Status: DISCONTINUED | OUTPATIENT
Start: 2025-02-14 | End: 2025-02-17 | Stop reason: HOSPADM

## 2025-02-13 RX ORDER — ENOXAPARIN SODIUM 100 MG/ML
60 INJECTION SUBCUTANEOUS EVERY 12 HOURS SCHEDULED
Status: DISCONTINUED | OUTPATIENT
Start: 2025-02-13 | End: 2025-02-13 | Stop reason: HOSPADM

## 2025-02-13 RX ORDER — VENLAFAXINE HYDROCHLORIDE 75 MG/1
150 CAPSULE, EXTENDED RELEASE ORAL DAILY
Status: ON HOLD | COMMUNITY
End: 2025-02-14 | Stop reason: ALTCHOICE

## 2025-02-13 RX ORDER — POTASSIUM CHLORIDE 1.5 G/1.58G
40 POWDER, FOR SOLUTION ORAL EVERY 6 HOURS PRN
Status: DISCONTINUED | OUTPATIENT
Start: 2025-02-13 | End: 2025-02-13 | Stop reason: HOSPADM

## 2025-02-13 RX ORDER — MAGNESIUM SULFATE HEPTAHYDRATE 40 MG/ML
2 INJECTION, SOLUTION INTRAVENOUS EVERY 6 HOURS PRN
Status: DISCONTINUED | OUTPATIENT
Start: 2025-02-13 | End: 2025-02-13 | Stop reason: HOSPADM

## 2025-02-13 RX ORDER — POLYETHYLENE GLYCOL 3350 17 G/17G
17 POWDER, FOR SOLUTION ORAL DAILY PRN
Status: DISCONTINUED | OUTPATIENT
Start: 2025-02-13 | End: 2025-02-17 | Stop reason: HOSPADM

## 2025-02-13 RX ORDER — ESCITALOPRAM OXALATE 10 MG/1
10 TABLET ORAL DAILY
Status: ON HOLD | COMMUNITY
End: 2025-02-17

## 2025-02-13 RX ORDER — TRAZODONE HYDROCHLORIDE 50 MG/1
1-2 TABLET ORAL NIGHTLY
Status: ON HOLD | COMMUNITY
End: 2025-02-14 | Stop reason: WASHOUT

## 2025-02-13 RX ORDER — POTASSIUM CHLORIDE 20 MEQ/1
40 TABLET, EXTENDED RELEASE ORAL EVERY 6 HOURS PRN
Status: DISCONTINUED | OUTPATIENT
Start: 2025-02-13 | End: 2025-02-13 | Stop reason: HOSPADM

## 2025-02-13 RX ADMIN — ACETYLCYSTEINE 10 G: 200 INJECTION, SOLUTION INTRAVENOUS at 02:34

## 2025-02-13 RX ADMIN — POTASSIUM CHLORIDE 40 MEQ: 1500 TABLET, EXTENDED RELEASE ORAL at 11:22

## 2025-02-13 RX ADMIN — ENOXAPARIN SODIUM 60 MG: 60 INJECTION SUBCUTANEOUS at 11:21

## 2025-02-13 SDOH — HEALTH STABILITY: MENTAL HEALTH: HAVE YOU BEEN THINKING ABOUT HOW YOU MIGHT DO THIS?: YES

## 2025-02-13 SDOH — HEALTH STABILITY: MENTAL HEALTH: SLEEP PATTERN: EARLY AWAKENING

## 2025-02-13 SDOH — SOCIAL STABILITY: SOCIAL INSECURITY: ARE YOU OR HAVE YOU BEEN THREATENED OR ABUSED PHYSICALLY, EMOTIONALLY, OR SEXUALLY BY ANYONE?: NO

## 2025-02-13 SDOH — HEALTH STABILITY: MENTAL HEALTH: HAVE YOU EVER DONE ANYTHING, STARTED TO DO ANYTHING, OR PREPARED TO DO ANYTHING TO END YOUR LIFE?: YES

## 2025-02-13 SDOH — HEALTH STABILITY: MENTAL HEALTH: HAVE YOU HAD THESE THOUGHTS AND HAD SOME INTENTION OF ACTING ON THEM?: YES

## 2025-02-13 SDOH — HEALTH STABILITY: MENTAL HEALTH: BEHAVIORS/MOOD: CALM;COOPERATIVE

## 2025-02-13 SDOH — SOCIAL STABILITY: SOCIAL INSECURITY: HAVE YOU HAD THOUGHTS OF HARMING ANYONE ELSE?: NO

## 2025-02-13 SDOH — SOCIAL STABILITY: SOCIAL INSECURITY: DOES ANYONE TRY TO KEEP YOU FROM HAVING/CONTACTING OTHER FRIENDS OR DOING THINGS OUTSIDE YOUR HOME?: NO

## 2025-02-13 SDOH — SOCIAL STABILITY: SOCIAL INSECURITY: HAVE YOU HAD ANY THOUGHTS OF HARMING ANYONE ELSE?: NO

## 2025-02-13 SDOH — HEALTH STABILITY: MENTAL HEALTH: NEEDS EXPRESSED: DENIES

## 2025-02-13 SDOH — HEALTH STABILITY: MENTAL HEALTH: CONTENT: UNABLE TO ASSESS

## 2025-02-13 SDOH — HEALTH STABILITY: MENTAL HEALTH

## 2025-02-13 SDOH — SOCIAL STABILITY: SOCIAL INSECURITY: ABUSE: ADULT

## 2025-02-13 SDOH — SOCIAL STABILITY: SOCIAL INSECURITY: HAS ANYONE EVER THREATENED TO HURT YOUR FAMILY OR YOUR PETS?: NO

## 2025-02-13 SDOH — SOCIAL STABILITY: SOCIAL NETWORK: VISITOR BEHAVIORS: UNABLE TO ASSESS

## 2025-02-13 SDOH — HEALTH STABILITY: MENTAL HEALTH: BEHAVIORAL HEALTH(WDL): EXCEPTIONS TO WDL

## 2025-02-13 SDOH — HEALTH STABILITY: MENTAL HEALTH: RISK OF SUICIDE: HIGH RISK

## 2025-02-13 SDOH — SOCIAL STABILITY: SOCIAL INSECURITY: FAMILY BEHAVIORS: UNABLE TO ASSESS

## 2025-02-13 SDOH — HEALTH STABILITY: MENTAL HEALTH: HAVE YOU WISHED YOU WERE DEAD OR WISHED YOU COULD GO TO SLEEP AND NOT WAKE UP?: YES

## 2025-02-13 SDOH — SOCIAL STABILITY: SOCIAL INSECURITY: DO YOU FEEL UNSAFE GOING BACK TO THE PLACE WHERE YOU ARE LIVING?: NO

## 2025-02-13 SDOH — SOCIAL STABILITY: SOCIAL INSECURITY: ARE YOU OR HAVE YOU BEEN THREATENED OR ABUSED PHYSICALLY, EMOTIONALLY, OR SEXUALLY BY ANYONE?: YES

## 2025-02-13 SDOH — SOCIAL STABILITY: SOCIAL INSECURITY: WERE YOU ABLE TO COMPLETE ALL THE BEHAVIORAL HEALTH SCREENINGS?: YES

## 2025-02-13 SDOH — SOCIAL STABILITY: SOCIAL INSECURITY: DO YOU FEEL ANYONE HAS EXPLOITED OR TAKEN ADVANTAGE OF YOU FINANCIALLY OR OF YOUR PERSONAL PROPERTY?: NO

## 2025-02-13 SDOH — SOCIAL STABILITY: SOCIAL INSECURITY: ARE THERE ANY APPARENT SIGNS OF INJURIES/BEHAVIORS THAT COULD BE RELATED TO ABUSE/NEGLECT?: NO

## 2025-02-13 SDOH — SOCIAL STABILITY: SOCIAL INSECURITY: HAS ANYONE EVER THREATENED TO HURT YOUR FAMILY OR YOUR PETS?: YES

## 2025-02-13 SDOH — HEALTH STABILITY: MENTAL HEALTH: EXPERIENCED ANY OF THE FOLLOWING LIFE EVENTS: SEXUAL ASSAULT

## 2025-02-13 SDOH — HEALTH STABILITY: MENTAL HEALTH: WAS THIS WITHIN THE PAST THREE MONTHS?: YES

## 2025-02-13 SDOH — HEALTH STABILITY: MENTAL HEALTH: HAVE YOU ACTUALLY HAD ANY THOUGHTS OF KILLING YOURSELF?: YES

## 2025-02-13 SDOH — SOCIAL STABILITY: SOCIAL NETWORK: PARENT/GUARDIAN/SIGNIFICANT OTHER INVOLVEMENT: NO INVOLVEMENT

## 2025-02-13 ASSESSMENT — COGNITIVE AND FUNCTIONAL STATUS - GENERAL
DAILY ACTIVITIY SCORE: 24
PATIENT BASELINE BEDBOUND: NO
MOBILITY SCORE: 24
MOBILITY SCORE: 24
PATIENT BASELINE BEDBOUND: NO
DAILY ACTIVITIY SCORE: 24

## 2025-02-13 ASSESSMENT — ENCOUNTER SYMPTOMS
WHEEZING: 0
DIARRHEA: 0
SORE THROAT: 0
FACIAL ASYMMETRY: 0
DYSURIA: 0
SEIZURES: 0
POLYPHAGIA: 0
PALPITATIONS: 0
BACK PAIN: 0
WEAKNESS: 0
FLANK PAIN: 0
SHORTNESS OF BREATH: 0
CONSTIPATION: 0
DIZZINESS: 0
APNEA: 0
COLOR CHANGE: 0
AGITATION: 0
ABDOMINAL PAIN: 0
POLYDIPSIA: 0
FEVER: 0
TROUBLE SWALLOWING: 0
FACIAL SWELLING: 0
FATIGUE: 0
CHEST TIGHTNESS: 0
HEADACHES: 0
DYSPHORIC MOOD: 1
DIFFICULTY URINATING: 0
VOMITING: 0
NAUSEA: 0

## 2025-02-13 ASSESSMENT — PATIENT HEALTH QUESTIONNAIRE - PHQ9
2. FEELING DOWN, DEPRESSED OR HOPELESS: NEARLY EVERY DAY
1. LITTLE INTEREST OR PLEASURE IN DOING THINGS: NEARLY EVERY DAY
1. LITTLE INTEREST OR PLEASURE IN DOING THINGS: SEVERAL DAYS
2. FEELING DOWN, DEPRESSED OR HOPELESS: NEARLY EVERY DAY
SUM OF ALL RESPONSES TO PHQ9 QUESTIONS 1 & 2: 4
SUM OF ALL RESPONSES TO PHQ9 QUESTIONS 1 & 2: 6

## 2025-02-13 ASSESSMENT — LIFESTYLE VARIABLES
HOW MANY STANDARD DRINKS CONTAINING ALCOHOL DO YOU HAVE ON A TYPICAL DAY: PATIENT DOES NOT DRINK
SKIP TO QUESTIONS 9-10: 1
PRESCIPTION_ABUSE_PAST_12_MONTHS: NO
SKIP TO QUESTIONS 9-10: 1
AUDIT-C TOTAL SCORE: 0
HOW MANY STANDARD DRINKS CONTAINING ALCOHOL DO YOU HAVE ON A TYPICAL DAY: PATIENT DOES NOT DRINK
HOW OFTEN DO YOU HAVE 6 OR MORE DRINKS ON ONE OCCASION: NEVER
HOW OFTEN DO YOU HAVE A DRINK CONTAINING ALCOHOL: NEVER
HOW OFTEN DO YOU HAVE 6 OR MORE DRINKS ON ONE OCCASION: NEVER
AUDIT-C TOTAL SCORE: 0
PRESCIPTION_ABUSE_PAST_12_MONTHS: NO
AUDIT-C TOTAL SCORE: 0
SUBSTANCE_ABUSE_PAST_12_MONTHS: NO
AUDIT-C TOTAL SCORE: 0
SUBSTANCE_ABUSE_PAST_12_MONTHS: NO
HOW OFTEN DO YOU HAVE A DRINK CONTAINING ALCOHOL: NEVER

## 2025-02-13 ASSESSMENT — ACTIVITIES OF DAILY LIVING (ADL)
TOILETING: INDEPENDENT
DRESSING YOURSELF: INDEPENDENT
HEARING - RIGHT EAR: FUNCTIONAL
ADEQUATE_TO_COMPLETE_ADL: YES
JUDGMENT_ADEQUATE_SAFELY_COMPLETE_DAILY_ACTIVITIES: YES
BATHING: INDEPENDENT
GROOMING: INDEPENDENT
HEARING - LEFT EAR: FUNCTIONAL
DRESSING YOURSELF: INDEPENDENT
HEARING - LEFT EAR: FUNCTIONAL
WALKS IN HOME: INDEPENDENT
BATHING: INDEPENDENT
TOILETING: INDEPENDENT
HEARING - RIGHT EAR: FUNCTIONAL
PATIENT'S MEMORY ADEQUATE TO SAFELY COMPLETE DAILY ACTIVITIES?: YES
GROOMING: INDEPENDENT
WALKS IN HOME: INDEPENDENT
FEEDING YOURSELF: INDEPENDENT
JUDGMENT_ADEQUATE_SAFELY_COMPLETE_DAILY_ACTIVITIES: YES
FEEDING YOURSELF: INDEPENDENT
PATIENT'S MEMORY ADEQUATE TO SAFELY COMPLETE DAILY ACTIVITIES?: YES
ADEQUATE_TO_COMPLETE_ADL: YES
LACK_OF_TRANSPORTATION: YES

## 2025-02-13 ASSESSMENT — PAIN - FUNCTIONAL ASSESSMENT
PAIN_FUNCTIONAL_ASSESSMENT: 0-10

## 2025-02-13 ASSESSMENT — PAIN SCALES - GENERAL
PAINLEVEL_OUTOF10: 0 - NO PAIN

## 2025-02-13 ASSESSMENT — COLUMBIA-SUICIDE SEVERITY RATING SCALE - C-SSRS
6. HAVE YOU EVER DONE ANYTHING, STARTED TO DO ANYTHING, OR PREPARED TO DO ANYTHING TO END YOUR LIFE?: SUICIDE ATTEMPT
6. HAVE YOU EVER DONE ANYTHING, STARTED TO DO ANYTHING, OR PREPARED TO DO ANYTHING TO END YOUR LIFE?: YES
1. IN THE PAST MONTH, HAVE YOU WISHED YOU WERE DEAD OR WISHED YOU COULD GO TO SLEEP AND NOT WAKE UP?: YES
2. HAVE YOU ACTUALLY HAD ANY THOUGHTS OF KILLING YOURSELF?: YES
4. HAVE YOU HAD THESE THOUGHTS AND HAD SOME INTENTION OF ACTING ON THEM?: YES
5. HAVE YOU STARTED TO WORK OUT OR WORKED OUT THE DETAILS OF HOW TO KILL YOURSELF? DO YOU INTEND TO CARRY OUT THIS PLAN?: YES
6. HAVE YOU EVER DONE ANYTHING, STARTED TO DO ANYTHING, OR PREPARED TO DO ANYTHING TO END YOUR LIFE?: YES

## 2025-02-13 NOTE — H&P
"Critical Care Medicine History and Physical        Subjective   Patient is a 34 y.o. male admitted on 2/12/2025  7:41 PM  with chief complaint of suicide attempt with ingestion of Tylenol.     HPI:  34-year-old male presenting to [facility] with chief complaint of intentional ingestion of Tylenol and suicide attempt.  PMHx significant for bipolar disorder depression, HTN, obesity. Patient's pronouns, \"She/her\" and prefers to be named, \"Rosibel\" patient endorses that he has been depressed lately noting he has had multiple attempts at suicide with ingestion of his medications noting last attempt was approximately 2 weeks ago with 90 tablets of 75 mg of Effexor XL. He also report prior ingestion of tylenol in SI attempts as well, noting the last was approximately one year ago.  Denies any homicidal ideations, violent outburst.  He lives in a home with his 2 adult parents, unemployed.  He denies any symptoms of chest pain, shortness of breath, abdominal pain, nausea, vomiting, diarrhea, fevers or chills.     Past Medical History:   Diagnosis Date    Bariatric surgery status 11/08/2018    Bariatric surgery status    Bipolar disorder, unspecified (Multi) 10/07/2021    Bipolar 1 disorder    Essential (primary) hypertension 12/21/2018    Elevated blood pressure reading in office with white coat syndrome, with diagnosis of hypertension    Morbid (severe) obesity due to excess calories (Multi) 11/10/2021    Morbid obesity    Morbid (severe) obesity due to excess calories (Multi) 11/10/2021    Morbid obesity    Other forms of dyspnea 12/06/2018    Exertional dyspnea    Other specified health status 10/19/2018    Known health problems: none    Other symptoms and signs involving the nervous system 10/19/2018    Suspected sleep apnea    Vitamin deficiency, unspecified 10/19/2018    Vitamin deficiency     Past Surgical History:   Procedure Laterality Date    OTHER SURGICAL HISTORY  02/18/2020    Oral surgery     (Not in a hospital " admission)    Patient has no known allergies.  Social History     Tobacco Use    Smoking status: Never    Smokeless tobacco: Never   Substance Use Topics    Alcohol use: Never    Drug use: Never     No family history on file.    Scheduled Medications:   acetylcysteine, 10,000 mg, intravenous, Once         Continuous Medications:         PRN Medications:       Review of Systems:  Review of Systems   Constitutional:  Negative for fatigue and fever.   HENT:  Negative for facial swelling, sore throat and trouble swallowing.    Eyes:  Negative for visual disturbance.   Respiratory:  Negative for apnea, chest tightness, shortness of breath and wheezing.    Cardiovascular:  Negative for chest pain, palpitations and leg swelling.   Gastrointestinal:  Negative for abdominal pain, constipation, diarrhea, nausea and vomiting.   Endocrine: Negative for polydipsia, polyphagia and polyuria.   Genitourinary:  Negative for difficulty urinating, dysuria, flank pain and testicular pain.   Musculoskeletal:  Negative for back pain.   Skin:  Negative for color change.   Neurological:  Negative for dizziness, seizures, facial asymmetry, weakness and headaches.   Psychiatric/Behavioral:  Positive for dysphoric mood, self-injury and suicidal ideas. Negative for agitation.        Objective   Vitals:  Most Recent:  Vitals:    02/13/25 0730   BP: 124/81   Pulse: 54   Resp: 20   Temp:    SpO2: 95%       24hr Min/Max:  Temp  Min: 36.3 °C (97.3 °F)  Max: 36.9 °C (98.4 °F)  Pulse  Min: 50  Max: 97  BP  Min: 124/81  Max: 160/74  Resp  Min: 16  Max: 25  SpO2  Min: 94 %  Max: 97 %    LDA:          Vent settings:       Hemodynamic parameters for last 24 hours:       No intake/output data recorded.      Physical exam:    Physical Exam  Vitals and nursing note reviewed.   Constitutional:       General: He is not in acute distress.     Appearance: Normal appearance. He is obese.   HENT:      Head: Normocephalic and atraumatic.      Nose: Nose normal.       Mouth/Throat:      Mouth: Mucous membranes are moist.      Pharynx: Oropharynx is clear.   Eyes:      Extraocular Movements: Extraocular movements intact.      Conjunctiva/sclera: Conjunctivae normal.      Pupils: Pupils are equal, round, and reactive to light.   Neck:      Vascular: No carotid bruit.   Cardiovascular:      Rate and Rhythm: Normal rate and regular rhythm.      Pulses: Normal pulses.      Heart sounds: Normal heart sounds.   Pulmonary:      Effort: Pulmonary effort is normal.      Breath sounds: Normal breath sounds.   Abdominal:      General: Abdomen is flat.      Palpations: Abdomen is soft.   Musculoskeletal:         General: Normal range of motion.      Cervical back: Neck supple.      Right lower leg: No edema.      Left lower leg: No edema.   Skin:     General: Skin is warm and dry.      Capillary Refill: Capillary refill takes less than 2 seconds.      Comments: Scattered linear scars consistent with self-harm to the upper extremities in various stages of healing   Neurological:      General: No focal deficit present.      Mental Status: He is alert.   Psychiatric:         Behavior: Behavior normal.         Thought Content: Thought content normal.         Judgment: Judgment normal.      Comments: Flat affect         Lab/Radiology/Diagnostic Review:  Results for orders placed or performed during the hospital encounter of 02/12/25 (from the past 24 hours)   Acetaminophen level   Result Value Ref Range    Acetaminophen 126.8 (H) 10.0 - 30.0 ug/mL   CBC and Auto Differential   Result Value Ref Range    WBC 10.0 4.4 - 11.3 x10*3/uL    nRBC 0.0 0.0 - 0.0 /100 WBCs    RBC 5.49 4.50 - 5.90 x10*6/uL    Hemoglobin 12.5 (L) 13.5 - 17.5 g/dL    Hematocrit 42.4 41.0 - 52.0 %    MCV 77 (L) 80 - 100 fL    MCH 22.8 (L) 26.0 - 34.0 pg    MCHC 29.5 (L) 32.0 - 36.0 g/dL    RDW 14.3 11.5 - 14.5 %    Platelets 236 150 - 450 x10*3/uL    Neutrophils % 63.3 40.0 - 80.0 %    Immature Granulocytes %, Automated 0.2  0.0 - 0.9 %    Lymphocytes % 22.3 13.0 - 44.0 %    Monocytes % 8.4 2.0 - 10.0 %    Eosinophils % 5.3 0.0 - 6.0 %    Basophils % 0.5 0.0 - 2.0 %    Neutrophils Absolute 6.33 1.20 - 7.70 x10*3/uL    Immature Granulocytes Absolute, Automated 0.02 0.00 - 0.70 x10*3/uL    Lymphocytes Absolute 2.23 1.20 - 4.80 x10*3/uL    Monocytes Absolute 0.84 0.10 - 1.00 x10*3/uL    Eosinophils Absolute 0.53 0.00 - 0.70 x10*3/uL    Basophils Absolute 0.05 0.00 - 0.10 x10*3/uL   Comprehensive metabolic panel   Result Value Ref Range    Glucose 139 (H) 74 - 99 mg/dL    Sodium 134 (L) 136 - 145 mmol/L    Potassium 3.7 3.5 - 5.3 mmol/L    Chloride 101 98 - 107 mmol/L    Bicarbonate 26 21 - 32 mmol/L    Anion Gap 11 10 - 20 mmol/L    Urea Nitrogen 10 6 - 23 mg/dL    Creatinine 1.03 0.50 - 1.30 mg/dL    eGFR >90 >60 mL/min/1.73m*2    Calcium 8.8 8.6 - 10.3 mg/dL    Albumin 3.8 3.4 - 5.0 g/dL    Alkaline Phosphatase 56 33 - 120 U/L    Total Protein 8.1 6.4 - 8.2 g/dL    AST 20 9 - 39 U/L    Bilirubin, Total 0.5 0.0 - 1.2 mg/dL    ALT 30 10 - 52 U/L   Red Top   Result Value Ref Range    Extra Tube Hold for add-ons.    PST Top   Result Value Ref Range    Extra Tube Hold for add-ons.    Acute Toxicology Panel, Blood   Result Value Ref Range    Acetaminophen 126.8 (H) 10.0 - 30.0 ug/mL    Salicylate  <3 4 - 20 mg/dL    Alcohol <10 <=10 mg/dL   Drug Screen, Urine   Result Value Ref Range    Amphetamine Screen, Urine Presumptive Negative Presumptive Negative    Barbiturate Screen, Urine Presumptive Negative Presumptive Negative    Benzodiazepines Screen, Urine Presumptive Negative Presumptive Negative    Cannabinoid Screen, Urine Presumptive Negative Presumptive Negative    Cocaine Metabolite Screen, Urine Presumptive Negative Presumptive Negative    Fentanyl Screen, Urine Presumptive Negative Presumptive Negative    Opiate Screen, Urine Presumptive Negative Presumptive Negative    Oxycodone Screen, Urine Presumptive Negative Presumptive Negative     PCP Screen, Urine Presumptive Negative Presumptive Negative    Methadone Screen, Urine Presumptive Negative Presumptive Negative   Protime-INR   Result Value Ref Range    Protime 12.9 (H) 9.8 - 12.8 seconds    INR 1.1 0.9 - 1.1   Electrocardiogram, 12-lead   Result Value Ref Range    Ventricular Rate 83 BPM    Atrial Rate 83 BPM    SD Interval 168 ms    QRS Duration 78 ms    QT Interval 388 ms    QTC Calculation(Bazett) 455 ms    P Axis 50 degrees    R Axis -11 degrees    T Axis 31 degrees    QRS Count 14 beats    Q Onset 231 ms    P Onset 147 ms    P Offset 194 ms    T Offset 425 ms    QTC Fredericia 432 ms   Acetaminophen level   Result Value Ref Range    Acetaminophen 132.5 (H) 10.0 - 30.0 ug/mL   Acetaminophen level   Result Value Ref Range    Acetaminophen 46.1 (H) 10.0 - 30.0 ug/mL     Electrocardiogram, 12-lead    Result Date: 2/13/2025  Normal sinus rhythm Low voltage QRS Borderline ECG When compared with ECG of 12-FEB-2025 22:07, (unconfirmed) QRS voltage has decreased        Assessment /Plan      #Intentional Tylenol ingestion associated with suicide attempt  #Bipolar depression  #Essential hypertension  #Obesity    Neuro:   #Bipolar depression   -Alert and oriented x 4  -Home meds: Lexapro, Atarax, Effexor  -GCS: E 4 V 5 M 6  -Pain: None   -Sedation: None  -CAM ICU monitor     Cardiac:   #HTN   - Goals:  [MAP> 65, HR ]  - Home meds: Cardura 1 mg tablet daily, Minipress 1 mg capsule, Aldactone 50 mg tablet daily  - Last echo: None on file  - Pressors: None    Pulmonary:   -no issue, no respiratory complaints/distress.   -History: None  -Home meds: None      Continuous pulse oximetry   O2 PRN to maintain SpO2 > 94%, wean as tolerated  -Imaging: Chest x-ray from outside facility no focal nitrates effusions or pneumothorax.    Gastrointestinal:   # Tylenol overdose  -30 g of Tylenol ingested 30 minutes prior to arrival  -Initial Tylenol level in the ED was 126.8 micrograms/L up trending to 132.5 at 4  hours and then down trend to 46.1 at 8 hour margo  -History: Multiple attempts in the past with Tylenol overdose most recent approximately 1 year ago  -Home meds: None  - Diet: N.p.o.  - Supplementation: None  - Prophylaxis: None  - Bowel regimen: None  - Last BM: 2025, liquid brown small-volume    Renal:   -no issue  - Daily RFP,Mg,Phos  - History: None  - Home meds: None  -External urinary catheter, strict I's and O's  Net IO Since Admission: No IO data has been entered for this period [25 0839]  Results from last 72 hours   Lab Units 25  1936   BUN mg/dL 10   CREATININE mg/dL 1.03         - Fluids: None mL/hr  - Electrolytes: Replete per protocol, goal K>4 Phos >3 Mg >2    Endocrine:   -No issue  -History: None  -Home meds: None  -sliding scale: None indicated  Results from last 7 days   Lab Units 25  1936   GLUCOSE mg/dL 139*      -BG < 180 goal    Hematology:   #Concern for liver dysfunction in the setting of Tylenol ingestion  - Daily CBC, coags  -History: Prior Tylenol overdose suicide attempts  -Home meds: None  Results from last 7 days   Lab Units 25  1936   HEMOGLOBIN g/dL 12.5*   HEMATOCRIT % 42.4   PLATELETS AUTO x10*3/uL 236     - DVT Prophylaxis: Lovenox 60 mg subcu every 12 hours    Infectious Disease:   -No issue  -History: None  -Home meds: None  Results from last 7 days   Lab Units 25  1936   WBC AUTO x10*3/uL 10.0     Temp (24hrs), Av.6 °C (97.9 °F), Min:36.3 °C (97.3 °F), Max:36.9 °C (98.4 °F)     -Abx: None  -Cultures: Not indicated     Musculoskeletal:   -No issue  -Ambulation as tolerated    Integumentary:   # Self-injury scars  -Home meds: None    Lines/Tubes/Drains:   PIV    Disposition: Patient admitted to the ICU for monitoring giving concern of severe Tylenol toxicity and suicide attempt consuming 30 g of acetaminophen, patient is to be transferred to [facility] [place] for hepatology consult, and inpatient psych.    CODE STATUS: Full    ABCDEF  Checklist  Analgesia: Spontaneous awakening trial to be pursued if clinically appropriate. RASS goal reviewed  Breathing: Spontaneous breathing trial to be pursued if clinically appropriate. Mechanical power of assisted ventilation reviewed  Choice of analgesia/sedation: Analgesic and sedative agents adjusted per clinical context.   Delirium assessed by CAM, will avoid exacerbating factors  Early mobility and exercise: Physical and occupational therapy engaged  Family: Plan of care, overall trajectory of patient shared with family. Questions elicited and answered as appropriate.       Due to the high probability of life threatening clinical decompensation, the patient required critical care time evaluating and managing this patient.  Critical care time included obtaining a history, examining the patient, ordering and reviewing studies, discussing, developing, and implementing a management plan, evaluating the patient's response to treatment, and discussion with other care team providers. I saw and evaluated the patient myself.  Critical care time was performed exclusive of billable procedures.    Assessment and plan discussed with attending physician      German Quintana PGY-2  Critical Care Medicine [facility]

## 2025-02-13 NOTE — CONSULTS
Reason for consult:  Suicidal attempt by overdose    History Of Present Illness  Nic Horowitz is a 34 y.o. adult presenting with Tylenol overdose.  Patient with history of bipolar disorder live with his mom  Patient was recently discharged from [facility] following another overdose  Following discharge he was started on Lexapro which he did not take  Patient went home feeling depressed rating his mood to be 10 out of 10 with 10 being bad  Has been feeling hopeless helpless and worthless  He still believes that the suicide attempt was successful and is not alive  Patient took 60 extra strength Tylenol 500 mg tablet with intention to commit suicide  As he was taking the medication his grandmother called him and he reported to her about the overdose  Grandmother call for help following which he got brought to the ER  Patient report anxiety symptoms and panic attacks  No audiovisual hallucinations or paranoid thoughts    Patient is going to be transferred to the main campus for further treatment of his medical condition    Past psychiatric history significant for multiple overdoses in the past at least 10 with multiple psychiatric admission  He sees a psychiatrist at [facility]    Past Medical History  She has a past medical history of Bariatric surgery status (11/08/2018), Bipolar disorder, unspecified (Multi) (10/07/2021), Essential (primary) hypertension (12/21/2018), Morbid (severe) obesity due to excess calories (Multi) (11/10/2021), Morbid (severe) obesity due to excess calories (Multi) (11/10/2021), Other forms of dyspnea (12/06/2018), Other specified health status (10/19/2018), Other symptoms and signs involving the nervous system (10/19/2018), and Vitamin deficiency, unspecified (10/19/2018).    Surgical History  She has a past surgical history that includes Other surgical history (02/18/2020).     Social History  She reports that she has never smoked. She has never used smokeless tobacco. She  reports that she does not drink alcohol and does not use drugs.     Allergies  Patient has no known allergies.          Mental Status Exam  Alert and oriented x 3 cooperative normal psychomotor activity.  Speech is decreased in rate and rhythm coherent.  Patient described his mood to be depressed and his affect is restricted.  He does not have any formal thought disorder.  Patient denies any audiovisual hallucinations or paranoid thoughts.  He still has active suicidal thoughts.  Judgment and insight is impaired.    Psychiatric Risk Assessment  Very high suicide risk    Last Recorded Vitals  Blood pressure 160/71, pulse 62, temperature 36.4 °C (97.5 °F), temperature source Temporal, resp. rate 25, height 1.829 m (6'), weight (!) 210 kg (462 lb 15.5 oz), SpO2 97%.    Relevant Results  Results for orders placed or performed during the hospital encounter of 02/12/25 (from the past 96 hours)   Acetaminophen level   Result Value Ref Range    Acetaminophen 126.8 (H) 10.0 - 30.0 ug/mL   CBC and Auto Differential   Result Value Ref Range    WBC 10.0 4.4 - 11.3 x10*3/uL    nRBC 0.0 0.0 - 0.0 /100 WBCs    RBC 5.49 4.50 - 5.90 x10*6/uL    Hemoglobin 12.5 (L) 13.5 - 17.5 g/dL    Hematocrit 42.4 41.0 - 52.0 %    MCV 77 (L) 80 - 100 fL    MCH 22.8 (L) 26.0 - 34.0 pg    MCHC 29.5 (L) 32.0 - 36.0 g/dL    RDW 14.3 11.5 - 14.5 %    Platelets 236 150 - 450 x10*3/uL    Neutrophils % 63.3 40.0 - 80.0 %    Immature Granulocytes %, Automated 0.2 0.0 - 0.9 %    Lymphocytes % 22.3 13.0 - 44.0 %    Monocytes % 8.4 2.0 - 10.0 %    Eosinophils % 5.3 0.0 - 6.0 %    Basophils % 0.5 0.0 - 2.0 %    Neutrophils Absolute 6.33 1.20 - 7.70 x10*3/uL    Immature Granulocytes Absolute, Automated 0.02 0.00 - 0.70 x10*3/uL    Lymphocytes Absolute 2.23 1.20 - 4.80 x10*3/uL    Monocytes Absolute 0.84 0.10 - 1.00 x10*3/uL    Eosinophils Absolute 0.53 0.00 - 0.70 x10*3/uL    Basophils Absolute 0.05 0.00 - 0.10 x10*3/uL   Comprehensive metabolic panel   Result  Value Ref Range    Glucose 139 (H) 74 - 99 mg/dL    Sodium 134 (L) 136 - 145 mmol/L    Potassium 3.7 3.5 - 5.3 mmol/L    Chloride 101 98 - 107 mmol/L    Bicarbonate 26 21 - 32 mmol/L    Anion Gap 11 10 - 20 mmol/L    Urea Nitrogen 10 6 - 23 mg/dL    Creatinine 1.03 0.50 - 1.30 mg/dL    eGFR >90 >60 mL/min/1.73m*2    Calcium 8.8 8.6 - 10.3 mg/dL    Albumin 3.8 3.4 - 5.0 g/dL    Alkaline Phosphatase 56 33 - 120 U/L    Total Protein 8.1 6.4 - 8.2 g/dL    AST 20 9 - 39 U/L    Bilirubin, Total 0.5 0.0 - 1.2 mg/dL    ALT 30 10 - 52 U/L   Red Top   Result Value Ref Range    Extra Tube Hold for add-ons.    PST Top   Result Value Ref Range    Extra Tube Hold for add-ons.    Acute Toxicology Panel, Blood   Result Value Ref Range    Acetaminophen 126.8 (H) 10.0 - 30.0 ug/mL    Salicylate  <3 4 - 20 mg/dL    Alcohol <10 <=10 mg/dL   Drug Screen, Urine   Result Value Ref Range    Amphetamine Screen, Urine Presumptive Negative Presumptive Negative    Barbiturate Screen, Urine Presumptive Negative Presumptive Negative    Benzodiazepines Screen, Urine Presumptive Negative Presumptive Negative    Cannabinoid Screen, Urine Presumptive Negative Presumptive Negative    Cocaine Metabolite Screen, Urine Presumptive Negative Presumptive Negative    Fentanyl Screen, Urine Presumptive Negative Presumptive Negative    Opiate Screen, Urine Presumptive Negative Presumptive Negative    Oxycodone Screen, Urine Presumptive Negative Presumptive Negative    PCP Screen, Urine Presumptive Negative Presumptive Negative    Methadone Screen, Urine Presumptive Negative Presumptive Negative   Protime-INR   Result Value Ref Range    Protime 12.9 (H) 9.8 - 12.8 seconds    INR 1.1 0.9 - 1.1   Electrocardiogram, 12-lead   Result Value Ref Range    Ventricular Rate 83 BPM    Atrial Rate 83 BPM    CA Interval 168 ms    QRS Duration 78 ms    QT Interval 388 ms    QTC Calculation(Bazett) 455 ms    P Axis 50 degrees    R Axis -11 degrees    T Axis 31 degrees     QRS Count 14 beats    Q Onset 231 ms    P Onset 147 ms    P Offset 194 ms    T Offset 425 ms    QTC Fredericia 432 ms   Acetaminophen level   Result Value Ref Range    Acetaminophen 132.5 (H) 10.0 - 30.0 ug/mL   Acetaminophen level   Result Value Ref Range    Acetaminophen 46.1 (H) 10.0 - 30.0 ug/mL   Comprehensive metabolic panel   Result Value Ref Range    Glucose 120 (H) 74 - 99 mg/dL    Sodium 135 (L) 136 - 145 mmol/L    Potassium 3.2 (L) 3.5 - 5.3 mmol/L    Chloride 102 98 - 107 mmol/L    Bicarbonate 27 21 - 32 mmol/L    Anion Gap 9 (L) 10 - 20 mmol/L    Urea Nitrogen 8 6 - 23 mg/dL    Creatinine 0.98 0.50 - 1.30 mg/dL    eGFR >90 >60 mL/min/1.73m*2    Calcium 9.2 8.6 - 10.3 mg/dL    Albumin 3.6 3.4 - 5.0 g/dL    Alkaline Phosphatase 41 33 - 120 U/L    Total Protein 7.6 6.4 - 8.2 g/dL    AST 13 9 - 39 U/L    Bilirubin, Total 0.6 0.0 - 1.2 mg/dL    ALT 24 10 - 52 U/L   Protime-INR   Result Value Ref Range    Protime 15.1 (H) 9.8 - 12.8 seconds    INR 1.3 (H) 0.9 - 1.1   Acetaminophen   Result Value Ref Range    Acetaminophen 10.5 10.0 - 30.0 ug/mL   Lavender Top   Result Value Ref Range    Extra Tube Hold for add-ons.      Electrocardiogram, 12-lead    Result Date: 2/13/2025  Normal sinus rhythm Low voltage QRS Borderline ECG When compared with ECG of 12-FEB-2025 22:07, (unconfirmed) QRS voltage has decreased         Assessment/Plan   Assessment & Plan  Intentional acetaminophen overdose, initial encounter (Multi)    Bipolar depression severe    Since patient is going to be transferred to the main campus for further treatment of his liver condition, I recommend that he be followed up with psych consult team for continual evaluation of his mental health and most likely needing inpatient admission when he is medically stable  Continue sitter  Please call me if needed         Refugio Rosenberg MD

## 2025-02-13 NOTE — PROGRESS NOTES

## 2025-02-13 NOTE — ED PROVIDER NOTES
EMERGENCY DEPARTMENT ENCOUNTER      Pt Name: Nic Horowitz  MRN: [id number]  Birthdate 1990  Date of evaluation: 2/12/2025  Provider: Inder Kaiser DO    CHIEF COMPLAINT       Chief Complaint   Patient presents with    Ingestion     Pt reports SI and denies HI. Pt reports he took 60 tyenol tabs 30 min prior to arrival.          HISTORY OF PRESENT ILLNESS    34-year-old male, comes emergency room for ingestion, history of depression, bipolar disorder, hypertension, obesity.  He said he has not been taking his medications however.  He said around 7 PM he took 60 extra-strength Tylenol, 500 mg.  He has no symptoms currently.  Said this was a suicidal intent.  No homicidal ideation, no auditory visual hallucinations.  Has a history of overdosing on Effexor in the past.      History provided by:  Patient      Nursing Notes were reviewed.    PAST MEDICAL HISTORY     Past Medical History:   Diagnosis Date    Bariatric surgery status 11/08/2018    Bariatric surgery status    Bipolar disorder, unspecified (Multi) 10/07/2021    Bipolar 1 disorder    Essential (primary) hypertension 12/21/2018    Elevated blood pressure reading in office with white coat syndrome, with diagnosis of hypertension    Morbid (severe) obesity due to excess calories (Multi) 11/10/2021    Morbid obesity    Morbid (severe) obesity due to excess calories (Multi) 11/10/2021    Morbid obesity    Other forms of dyspnea 12/06/2018    Exertional dyspnea    Other specified health status 10/19/2018    Known health problems: none    Other symptoms and signs involving the nervous system 10/19/2018    Suspected sleep apnea    Vitamin deficiency, unspecified 10/19/2018    Vitamin deficiency         SURGICAL HISTORY       Past Surgical History:   Procedure Laterality Date    OTHER SURGICAL HISTORY  02/18/2020    Oral surgery         CURRENT MEDICATIONS       Previous Medications    CHOLECALCIFEROL (VITAMIN D-3) 25 MCG (1000 UT) TABLET    Take 1 tablet  (25 mcg) by mouth once daily.    ESTRADIOL (ESTRACE) 2 MG TABLET    Take 1 tablet (2 mg) by mouth in the morning and 1 tablet (2 mg) before bedtime.    FERROUS SULFATE 325 (65 FE) MG TABLET    Take 1 tablet (325 mg) by mouth every other day.    FLUTICASONE (FLONASE) 50 MCG/ACTUATION NASAL SPRAY    Administer 1 spray into each nostril once daily.    SPIRONOLACTONE (ALDACTONE) 50 MG TABLET    Take 1 tablet (50 mg) by mouth in the morning and 1 tablet (50 mg) before bedtime.       ALLERGIES     Patient has no known allergies.    FAMILY HISTORY     No family history on file.       SOCIAL HISTORY       Social History     Socioeconomic History    Marital status: Single   Tobacco Use    Smoking status: Never    Smokeless tobacco: Never   Substance and Sexual Activity    Alcohol use: Never    Drug use: Never     Social Drivers of Health     Financial Resource Strain: High Risk (10/14/2024)    Received from [email or web address]    Overall Financial Resource Strain (CARDIA)     Difficulty of Paying Living Expenses: Hard   Food Insecurity: No Food Insecurity (1/28/2025)    Received from [facility]    Hunger Vital Sign     Worried About Running Out of Food in the Last Year: Never true     Ran Out of Food in the Last Year: Never true   Transportation Needs: No Transportation Needs (1/28/2025)    Received from [facility]    PRAPARE - Transportation     Lack of Transportation (Medical): No     Lack of Transportation (Non-Medical): No   Physical Activity: Inactive (10/14/2024)    Received from [email or web address]    Exercise Vital Sign     Days of Exercise per Week: 0 days     Minutes of Exercise per Session: 0 min   Stress: Stress Concern Present (10/14/2024)    Received from [email or web address]    [employer] - Occupational Stress Questionnaire     Feeling of Stress : Very much   Social Connections: Socially Isolated (10/14/2024)    Received from [email or web address]    Social Connection and Isolation Panel [NHANES]      Frequency of Communication with Friends and Family: More than three times a week     Frequency of Social Gatherings with Friends and Family: Once a week     Attends [employer] Services: Never     Active Member of Clubs or Organizations: No     Attends Club or Organization Meetings: Never     Marital Status: Never    Intimate Partner Violence: Not At Risk (10/14/2024)    Received from [email or web address]    Humiliation, Afraid, Rape, and Kick questionnaire     Fear of Current or Ex-Partner: No     Emotionally Abused: No     Physically Abused: No     Sexually Abused: No   Housing Stability: Unknown (1/28/2025)    Received from [facility]    Housing Stability Vital Sign     Unable to Pay for Housing in the Last Year: No     Homeless in the Last Year: No       SCREENINGS                        PHYSICAL EXAM    (up to 7 for level 4, 8 or more for level 5)     ED Triage Vitals [02/12/25 1927]   Temperature Heart Rate Respirations BP   36.9 °C (98.4 °F) 97 16 160/74      Pulse Ox Temp src Heart Rate Source Patient Position   97 % -- -- --      BP Location FiO2 (%)     Right arm --       Physical Exam  Vitals and nursing note reviewed.   Constitutional:       Appearance: Normal appearance.   HENT:      Head: Normocephalic and atraumatic.   Eyes:      General: No scleral icterus.        Right eye: No discharge.         Left eye: No discharge.      Conjunctiva/sclera: Conjunctivae normal.   Cardiovascular:      Rate and Rhythm: Normal rate and regular rhythm.   Pulmonary:      Effort: Pulmonary effort is normal. No respiratory distress.   Abdominal:      General: Abdomen is flat. There is no distension.      Palpations: Abdomen is soft.      Tenderness: There is no abdominal tenderness. There is no guarding or rebound.   Musculoskeletal:      Cervical back: Normal range of motion.   Skin:     General: Skin is warm and dry.   Neurological:      Mental Status: He is alert. Mental status is at baseline.   Psychiatric:          Mood and Affect: Mood normal.         Behavior: Behavior normal.          DIAGNOSTIC RESULTS     LABS:  Labs Reviewed   ACETAMINOPHEN - Abnormal       Result Value    Acetaminophen 126.8 (*)    CBC WITH AUTO DIFFERENTIAL - Abnormal    WBC 10.0      nRBC 0.0      RBC 5.49      Hemoglobin 12.5 (*)     Hematocrit 42.4      MCV 77 (*)     MCH 22.8 (*)     MCHC 29.5 (*)     RDW 14.3      Platelets 236      Neutrophils % 63.3      Immature Granulocytes %, Automated 0.2      Lymphocytes % 22.3      Monocytes % 8.4      Eosinophils % 5.3      Basophils % 0.5      Neutrophils Absolute 6.33      Immature Granulocytes Absolute, Automated 0.02      Lymphocytes Absolute 2.23      Monocytes Absolute 0.84      Eosinophils Absolute 0.53      Basophils Absolute 0.05     COMPREHENSIVE METABOLIC PANEL - Abnormal    Glucose 139 (*)     Sodium 134 (*)     Potassium 3.7      Chloride 101      Bicarbonate 26      Anion Gap 11      Urea Nitrogen 10      Creatinine 1.03      eGFR >90      Calcium 8.8      Albumin 3.8      Alkaline Phosphatase 56      Total Protein 8.1      AST 20      Bilirubin, Total 0.5      ALT 30     ACUTE TOXICOLOGY PANEL, BLOOD - Abnormal    Acetaminophen 126.8 (*)     Salicylate  <3      Alcohol <10     PROTIME-INR - Abnormal    Protime 12.9 (*)     INR 1.1     DRUG SCREEN,URINE - Normal    Amphetamine Screen, Urine Presumptive Negative      Barbiturate Screen, Urine Presumptive Negative      Benzodiazepines Screen, Urine Presumptive Negative      Cannabinoid Screen, Urine Presumptive Negative      Cocaine Metabolite Screen, Urine Presumptive Negative      Fentanyl Screen, Urine Presumptive Negative      Opiate Screen, Urine Presumptive Negative      Oxycodone Screen, Urine Presumptive Negative      PCP Screen, Urine Presumptive Negative      Methadone Screen, Urine Presumptive Negative      Narrative:     Drug screen results are presumptive and should not be used to assess   compliance with prescribed medication.  Contact the performing [facility] laboratory   to add-on definitive confirmatory testing if clinically indicated.    Toxicology screening results are reported qualitatively. The concentration must   be greater than or equal to the cutoff to be reported as positive. The concentration   at which the screening test can detect an individual drug or metabolite varies.   The absence of expected drug(s) and/or drug metabolite(s) may indicate non-compliance,   inappropriate timing of specimen collection relative to drug administration, poor drug   absorption, diluted/adulterated urine, or limitations of testing. For medical purposes   only; not valid for forensic use.    Interpretive questions should be directed to the laboratory medical directors.   ACETAMINOPHEN       All other labs were within normal range or not returned as of this dictation.    Imaging  No orders to display        Procedures  Critical Care    Performed by: Inder Kaiser DO  Authorized by: Emanuel Montiel DO    Critical care provider statement:     Critical care time (minutes):  45    Critical care time was exclusive of:  Separately billable procedures and treating other patients    Critical care was necessary to treat or prevent imminent or life-threatening deterioration of the following conditions:  Toxidrome    Critical care was time spent personally by me on the following activities:  Blood draw for specimens, discussions with consultants, evaluation of patient's response to treatment, examination of patient, obtaining history from patient or surrogate, ordering and performing treatments and interventions, ordering and review of laboratory studies and re-evaluation of patient's condition    Care discussed with: accepting provider at another facility    Comments:      This patient presented with a toxic single-dose Tylenol ingestion, required immediate administration of N-acetylcysteine, required speaking with poison control, multiple consultants  including [facility] MICU, [facility] MICU, [facility] hepatology.  Patient required reassessments, and critical care for possible hepatic failure.       EMERGENCY DEPARTMENT COURSE/MDM:     ED Course as of 02/12/25 2259 Wed Feb 12, 2025 1954 Spoke with poison control, they recommended activated charcoal, agreed with plan to administer N-acetylcysteine. [RD]   2019 2010 hrs.: EKG personally reviewed by myself shows sinus rhythm with ventricular rate 78 bpm.  QRS in 190 ms.  QTc 424 ms.  Normal axis.  No acute ischemic injury pattern appreciated. [NW]      ED Course User Index  [NW] Emanuel Montiel DO  [RD] Inder Kaiser DO         Diagnoses as of 02/12/25 2259   Intentional acetaminophen overdose, initial encounter (Multi)        Medical Decision Making  34-year-old male comes emergency room after Tylenol overdose, did initiate psychiatric workup as this was a suicide attempt patient does not have capacity.  Did obtain Tylenol level initially, did Diamond a Tylenol level to be checked at 11 PM at the 4-hour margo given time of ingestion was 7 PM.  Did speak with poison control who recommended activated charcoal, agreed with the plan administer N-acetylcysteine.  Patient did meet the criteria for toxic ingestion with a single dose of 150 mg/kilograms as he took 30,000 mg of Tylenol.  Spoke with our intensivist who recommended patient be transferred to a liver center.  Did speak with [facility], patient is accepted to the stepdown unit at [facility] for further management.  Patient will need to be medically managed prior to psychiatric evaluation.        Patient and or family in agreement and understanding of treatment plan.  All questions answered.      I reviewed the case with the attending ED physician. The attending ED physician agrees with the plan. Patient and/or patient´s representative was counseled regarding labs, imaging, likely diagnosis, and plan. All questions were answered.    ED Medications  administered this visit:    Medications   acetylcysteine (Acetadote) 5 g in dextrose 5% 500 mL IV infusion (5 g intravenous New Bag 2/12/25 2218)   acetylcysteine (Acetadote) 10 g in dextrose 5% 1,000 mL IV infusion (has no administration in time range)   acetylcysteine (Acetadote) 15 g in dextrose 5% 200 mL IV infusion (0 g intravenous Stopped 2/12/25 2215)   activated charcoal-sorbitoL (Actidose-Sorbitol) 25 gram/120 mL oral suspension suspension 50 g (50 g oral Given 2/12/25 2021)        Final Impression:   1. Intentional acetaminophen overdose, initial encounter (Multi)          (Please note that portions of this note were completed with a voice recognition program.  Efforts were made to edit the dictations but occasionally words are mis-transcribed.)     Inder Kaiser DO  Resident  02/12/25 3997

## 2025-02-13 NOTE — CONSULTS
This note was created using voice recognition transcription software. Despite proofreading, unintentional typographical errors may be present. Please contact the GI office with any questions or concerns.       This is a 35 yo Female with a PMH of depression, bipolar, HTN, intentional medication overdose, cutting, and obesity who presented to [facility] on 2/12/25 with reports of a Tylenol overdose.  GI was consulted for Tylenol overdose. Patient is unemployed, lives at home with his mother.  He is the only child.  His father has been out of the picture since the age of 3.         Subjective  Ingested 60 Tylenol tabs prior to arrival.    He states that he has done this several times before.  He recently ran out of his psych medication. Not currently homicidal/suicidal.    No nausea, vomiting, abdominal pain, bleeding.       EGD-Denies   Colonoscopy-Denies   BM-Every 2-3 days.  Normal consistency.  No bleeding or weight loss.  FHX-Denies   SHX-No tobacco/illicits/ETOH  Ab Sx-Denies   NSAIDs- Denies         Allergies: as mentioned in H&P      A 10 point review of system is negative except for what is mentioned in the HPI    Vital Signs: Reviewed    Physical Exam:  General: Polite, calm, resting  Skin:  Warm and dry, no jaundice  HEENT: No scleral icterus, no conjunctival pallor, normocephalic, atraumatic, mucous membranes moist  Neck:  atraumatic, trachea midline, no JVD  Chest:  Clear to auscultation bilaterally. No wheezes, rales, or rhonchi  CV:  Regular rate and rhythm.  Positive S1/S2  Abdomen: Obese, no distension, +BS, soft, non-tender to palpation, no rebound tenderness, no guarding, no rigidity, no discernible ascites   Extremities: no lower extremity edema, chronic pigmentary changes, no cyanosis  Neurological:  A&Ox3, no asterixis  Psychiatric: cooperative     Investigations:  Labs, radiological imaging and cardiac work up were reviewed      Objective:         2/13/2025     7:30 AM 2/13/2025     9:15 AM  2/13/2025     9:30 AM 2/13/2025     9:45 AM 2/13/2025    10:00 AM 2/13/2025    10:11 AM 2/13/2025    10:15 AM   Vitals   Systolic 124 164 147 140 138 147 155   Diastolic 81 97 69 63 72 69 70   BP Location Right arm  Left arm   Left arm    Heart Rate 54 68 60 58 60 62 60   Temp   35.6 °C (96.1 °F)   35.6 °C (96.1 °F)    Resp 20 19 24 22 27 17 22   Height      1.829 m (6')    Weight (lb)      462.97    BMI      62.79 kg/m2    BSA (m2)      3.27 m2           Medications:  acetylcysteine, 10,000 mg, intravenous, Once         Recent Results (from the past 72 hours)   Acetaminophen level    Collection Time: 02/12/25  7:36 PM   Result Value Ref Range    Acetaminophen 126.8 (H) 10.0 - 30.0 ug/mL   CBC and Auto Differential    Collection Time: 02/12/25  7:36 PM   Result Value Ref Range    WBC 10.0 4.4 - 11.3 x10*3/uL    nRBC 0.0 0.0 - 0.0 /100 WBCs    RBC 5.49 4.50 - 5.90 x10*6/uL    Hemoglobin 12.5 (L) 13.5 - 17.5 g/dL    Hematocrit 42.4 41.0 - 52.0 %    MCV 77 (L) 80 - 100 fL    MCH 22.8 (L) 26.0 - 34.0 pg    MCHC 29.5 (L) 32.0 - 36.0 g/dL    RDW 14.3 11.5 - 14.5 %    Platelets 236 150 - 450 x10*3/uL    Neutrophils % 63.3 40.0 - 80.0 %    Immature Granulocytes %, Automated 0.2 0.0 - 0.9 %    Lymphocytes % 22.3 13.0 - 44.0 %    Monocytes % 8.4 2.0 - 10.0 %    Eosinophils % 5.3 0.0 - 6.0 %    Basophils % 0.5 0.0 - 2.0 %    Neutrophils Absolute 6.33 1.20 - 7.70 x10*3/uL    Immature Granulocytes Absolute, Automated 0.02 0.00 - 0.70 x10*3/uL    Lymphocytes Absolute 2.23 1.20 - 4.80 x10*3/uL    Monocytes Absolute 0.84 0.10 - 1.00 x10*3/uL    Eosinophils Absolute 0.53 0.00 - 0.70 x10*3/uL    Basophils Absolute 0.05 0.00 - 0.10 x10*3/uL   Comprehensive metabolic panel    Collection Time: 02/12/25  7:36 PM   Result Value Ref Range    Glucose 139 (H) 74 - 99 mg/dL    Sodium 134 (L) 136 - 145 mmol/L    Potassium 3.7 3.5 - 5.3 mmol/L    Chloride 101 98 - 107 mmol/L    Bicarbonate 26 21 - 32 mmol/L    Anion Gap 11 10 - 20 mmol/L     Urea Nitrogen 10 6 - 23 mg/dL    Creatinine 1.03 0.50 - 1.30 mg/dL    eGFR >90 >60 mL/min/1.73m*2    Calcium 8.8 8.6 - 10.3 mg/dL    Albumin 3.8 3.4 - 5.0 g/dL    Alkaline Phosphatase 56 33 - 120 U/L    Total Protein 8.1 6.4 - 8.2 g/dL    AST 20 9 - 39 U/L    Bilirubin, Total 0.5 0.0 - 1.2 mg/dL    ALT 30 10 - 52 U/L   Red Top    Collection Time: 02/12/25  7:36 PM   Result Value Ref Range    Extra Tube Hold for add-ons.    PST Top    Collection Time: 02/12/25  7:36 PM   Result Value Ref Range    Extra Tube Hold for add-ons.    Acute Toxicology Panel, Blood    Collection Time: 02/12/25  7:36 PM   Result Value Ref Range    Acetaminophen 126.8 (H) 10.0 - 30.0 ug/mL    Salicylate  <3 4 - 20 mg/dL    Alcohol <10 <=10 mg/dL   Drug Screen, Urine    Collection Time: 02/12/25  7:57 PM   Result Value Ref Range    Amphetamine Screen, Urine Presumptive Negative Presumptive Negative    Barbiturate Screen, Urine Presumptive Negative Presumptive Negative    Benzodiazepines Screen, Urine Presumptive Negative Presumptive Negative    Cannabinoid Screen, Urine Presumptive Negative Presumptive Negative    Cocaine Metabolite Screen, Urine Presumptive Negative Presumptive Negative    Fentanyl Screen, Urine Presumptive Negative Presumptive Negative    Opiate Screen, Urine Presumptive Negative Presumptive Negative    Oxycodone Screen, Urine Presumptive Negative Presumptive Negative    PCP Screen, Urine Presumptive Negative Presumptive Negative    Methadone Screen, Urine Presumptive Negative Presumptive Negative   Protime-INR    Collection Time: 02/12/25  7:57 PM   Result Value Ref Range    Protime 12.9 (H) 9.8 - 12.8 seconds    INR 1.1 0.9 - 1.1   Electrocardiogram, 12-lead    Collection Time: 02/12/25  9:14 PM   Result Value Ref Range    Ventricular Rate 83 BPM    Atrial Rate 83 BPM    ME Interval 168 ms    QRS Duration 78 ms    QT Interval 388 ms    QTC Calculation(Bazett) 455 ms    P Axis 50 degrees    R Axis -11 degrees    T Axis 31  degrees    QRS Count 14 beats    Q Onset 231 ms    P Onset 147 ms    P Offset 194 ms    T Offset 425 ms    QTC Fredericia 432 ms   Acetaminophen level    Collection Time: 02/12/25 11:01 PM   Result Value Ref Range    Acetaminophen 132.5 (H) 10.0 - 30.0 ug/mL   Acetaminophen level    Collection Time: 02/13/25  3:17 AM   Result Value Ref Range    Acetaminophen 46.1 (H) 10.0 - 30.0 ug/mL   Protime-INR    Collection Time: 02/13/25 10:00 AM   Result Value Ref Range    Protime 15.1 (H) 9.8 - 12.8 seconds    INR 1.3 (H) 0.9 - 1.1          Assessment:    This is a 33 yo Female with a PMH of depression, bipolar, HTN, intentional medication overdose, cutting, and obesity who presented to [email or web address] on 2/12/25 with reports of a Tylenol overdose.  GI was consulted for Tylenol overdose. Initial Tylenol level was 126.8 and now it's 10.5. Treated with N-acetylcysteine.  Liver tests normal.  Patient in for a bed @ [facility] psychiatric evaluation/liver center.      Plan  1.)  Tylenol overdose-Acetycysteine per protocol.  Advised patient to follow up with his psychiatrist and counselor.  Trend liver tests.      Discussed above patient assessment and plan with Dr. Valderrama.  Will sign off.  I spent 65 minutes in the professional and overall care of this patient.      02/13/25 at 10:41 AM - RAFAEL Alberts-CNP

## 2025-02-13 NOTE — CARE PLAN
Pt. Will remain safe this shift.    Pt. Will reposition q2hrs to prevent skin breakdown this shift.    Pt. Will not attempt to harm himself by end of this shift.      1805:  Pt. Has remained safe this shift.  Pt. Has repositioned q2hrs to prevent skin breakdown this shift.  Pt. Has not attempted to harm himself this shift.

## 2025-02-13 NOTE — NURSING NOTE
0930:  Pt. Arrived from ED via cart, monitor, IV gtt and ED RN.  See admission forms, vital signs, care plan and education.  Chart check done.    1000:  CCM team in to see pt.  See orders.  Continue to monitor.    1120:  Pt's potassium 3.2.  Administered 40meq kcl po per ICU standing orders.  Continue to monitor.    1315:  Pt up to lounge chair independently with steady gate.    1700:  Report called to Halima at [facility] MICU SDU BED [id number].  [phone].    1825:  Pt. Transferred to  downtow via [facility] transport  Critical Care via stretcher.  EMS will stop down in ER and get pt's belongings from [facility] in ER.

## 2025-02-14 LAB
ALBUMIN SERPL BCP-MCNC: 3.5 G/DL (ref 3.4–5)
ALP SERPL-CCNC: 51 U/L (ref 33–120)
ALT SERPL W P-5'-P-CCNC: 18 U/L (ref 7–52)
ANION GAP SERPL CALC-SCNC: 12 MMOL/L (ref 10–20)
APTT PPP: 31 SECONDS (ref 27–38)
AST SERPL W P-5'-P-CCNC: 16 U/L (ref 9–39)
ATRIAL RATE: 83 BPM
BILIRUB DIRECT SERPL-MCNC: 0.2 MG/DL (ref 0–0.3)
BILIRUB SERPL-MCNC: 0.6 MG/DL (ref 0–1.2)
BUN SERPL-MCNC: 8 MG/DL (ref 6–23)
CALCIUM SERPL-MCNC: 9.1 MG/DL (ref 8.6–10.6)
CHLORIDE SERPL-SCNC: 106 MMOL/L (ref 98–107)
CO2 SERPL-SCNC: 24 MMOL/L (ref 21–32)
CREAT SERPL-MCNC: 1.05 MG/DL (ref 0.5–1.3)
EGFRCR SERPLBLD CKD-EPI 2021: >90 ML/MIN/1.73M*2
ERYTHROCYTE [DISTWIDTH] IN BLOOD BY AUTOMATED COUNT: 14.5 % (ref 11.5–14.5)
GLUCOSE SERPL-MCNC: 81 MG/DL (ref 74–99)
HCT VFR BLD AUTO: 42.1 % (ref 36–52)
HGB BLD-MCNC: 12 G/DL (ref 12–17.5)
INR PPP: 1.2 (ref 0.9–1.1)
MAGNESIUM SERPL-MCNC: 1.88 MG/DL (ref 1.6–2.4)
MCH RBC QN AUTO: 23.5 PG (ref 26–34)
MCHC RBC AUTO-ENTMCNC: 28.5 G/DL (ref 32–36)
MCV RBC AUTO: 83 FL (ref 80–100)
NRBC BLD-RTO: 0 /100 WBCS (ref 0–0)
P AXIS: 50 DEGREES
P OFFSET: 194 MS
P ONSET: 147 MS
PHOSPHATE SERPL-MCNC: 3 MG/DL (ref 2.5–4.9)
PLATELET # BLD AUTO: 217 X10*3/UL (ref 150–450)
POTASSIUM SERPL-SCNC: 3.7 MMOL/L (ref 3.5–5.3)
PR INTERVAL: 168 MS
PROT SERPL-MCNC: 7.2 G/DL (ref 6.4–8.2)
PROTHROMBIN TIME: 13 SECONDS (ref 9.8–12.8)
Q ONSET: 231 MS
QRS COUNT: 14 BEATS
QRS DURATION: 78 MS
QT INTERVAL: 388 MS
QTC CALCULATION(BAZETT): 455 MS
QTC FREDERICIA: 432 MS
R AXIS: -11 DEGREES
RBC # BLD AUTO: 5.1 X10*6/UL (ref 4–5.9)
SODIUM SERPL-SCNC: 138 MMOL/L (ref 136–145)
T AXIS: 31 DEGREES
T OFFSET: 425 MS
VENTRICULAR RATE: 83 BPM
WBC # BLD AUTO: 8.2 X10*3/UL (ref 4.4–11.3)

## 2025-02-14 PROCEDURE — 85027 COMPLETE CBC AUTOMATED: CPT | Performed by: NURSE PRACTITIONER

## 2025-02-14 PROCEDURE — 84100 ASSAY OF PHOSPHORUS: CPT | Performed by: NURSE PRACTITIONER

## 2025-02-14 PROCEDURE — 85610 PROTHROMBIN TIME: CPT | Performed by: NURSE PRACTITIONER

## 2025-02-14 PROCEDURE — 80053 COMPREHEN METABOLIC PANEL: CPT | Performed by: NURSE PRACTITIONER

## 2025-02-14 PROCEDURE — 2500000001 HC RX 250 WO HCPCS SELF ADMINISTERED DRUGS (ALT 637 FOR MEDICARE OP): Performed by: NURSE PRACTITIONER

## 2025-02-14 PROCEDURE — 99223 1ST HOSP IP/OBS HIGH 75: CPT | Performed by: PSYCHIATRY & NEUROLOGY

## 2025-02-14 PROCEDURE — 85730 THROMBOPLASTIN TIME PARTIAL: CPT | Performed by: NURSE PRACTITIONER

## 2025-02-14 PROCEDURE — 80069 RENAL FUNCTION PANEL: CPT | Performed by: NURSE PRACTITIONER

## 2025-02-14 PROCEDURE — 83735 ASSAY OF MAGNESIUM: CPT | Performed by: NURSE PRACTITIONER

## 2025-02-14 PROCEDURE — 84075 ASSAY ALKALINE PHOSPHATASE: CPT | Performed by: NURSE PRACTITIONER

## 2025-02-14 PROCEDURE — G0378 HOSPITAL OBSERVATION PER HR: HCPCS

## 2025-02-14 PROCEDURE — 2060000001 HC INTERMEDIATE ICU ROOM DAILY

## 2025-02-14 PROCEDURE — 36415 COLL VENOUS BLD VENIPUNCTURE: CPT | Performed by: NURSE PRACTITIONER

## 2025-02-14 PROCEDURE — 82248 BILIRUBIN DIRECT: CPT | Performed by: NURSE PRACTITIONER

## 2025-02-14 PROCEDURE — 2500000004 HC RX 250 GENERAL PHARMACY W/ HCPCS (ALT 636 FOR OP/ED): Performed by: NURSE PRACTITIONER

## 2025-02-14 RX ORDER — ENOXAPARIN SODIUM 100 MG/ML
60 INJECTION SUBCUTANEOUS EVERY 12 HOURS SCHEDULED
Status: DISCONTINUED | OUTPATIENT
Start: 2025-02-14 | End: 2025-02-17 | Stop reason: HOSPADM

## 2025-02-14 RX ORDER — ERGOCALCIFEROL 1.25 MG/1
1250 CAPSULE ORAL WEEKLY
Status: DISCONTINUED | OUTPATIENT
Start: 2025-02-19 | End: 2025-02-17 | Stop reason: HOSPADM

## 2025-02-14 RX ORDER — ASPIRIN 325 MG
50000 TABLET, DELAYED RELEASE (ENTERIC COATED) ORAL WEEKLY
Status: DISCONTINUED | OUTPATIENT
Start: 2025-02-14 | End: 2025-02-14

## 2025-02-14 RX ORDER — MULTIVIT-MIN/IRON FUM/FOLIC AC 7.5 MG-4
1 TABLET ORAL DAILY
Start: 2025-02-15

## 2025-02-14 RX ORDER — ERGOCALCIFEROL 1.25 MG/1
50000 CAPSULE ORAL WEEKLY
COMMUNITY
Start: 2024-03-17 | End: 2025-05-08

## 2025-02-14 RX ADMIN — Medication 50000 UNITS: at 09:36

## 2025-02-14 RX ADMIN — ENOXAPARIN SODIUM 60 MG: 100 INJECTION SUBCUTANEOUS at 09:36

## 2025-02-14 RX ADMIN — PANTOPRAZOLE SODIUM 40 MG: 40 INJECTION, POWDER, FOR SOLUTION INTRAVENOUS at 09:36

## 2025-02-14 RX ADMIN — ENOXAPARIN SODIUM 60 MG: 100 INJECTION SUBCUTANEOUS at 20:19

## 2025-02-14 RX ADMIN — Medication 1 TABLET: at 09:36

## 2025-02-14 SDOH — HEALTH STABILITY: MENTAL HEALTH
DO YOU FEEL STRESS - TENSE, RESTLESS, NERVOUS, OR ANXIOUS, OR UNABLE TO SLEEP AT NIGHT BECAUSE YOUR MIND IS TROUBLED ALL THE TIME - THESE DAYS?: PATIENT DECLINED

## 2025-02-14 SDOH — SOCIAL STABILITY: SOCIAL NETWORK: HOW OFTEN DO YOU ATTEND CHURCH OR RELIGIOUS SERVICES?: PATIENT DECLINED

## 2025-02-14 SDOH — ECONOMIC STABILITY: FOOD INSECURITY: WITHIN THE PAST 12 MONTHS, THE FOOD YOU BOUGHT JUST DIDN'T LAST AND YOU DIDN'T HAVE MONEY TO GET MORE.: PATIENT DECLINED

## 2025-02-14 SDOH — SOCIAL STABILITY: SOCIAL NETWORK
DO YOU BELONG TO ANY CLUBS OR ORGANIZATIONS SUCH AS CHURCH GROUPS, UNIONS, FRATERNAL OR ATHLETIC GROUPS, OR SCHOOL GROUPS?: PATIENT DECLINED

## 2025-02-14 SDOH — SOCIAL STABILITY: SOCIAL INSECURITY
WITHIN THE LAST YEAR, HAVE YOU BEEN HUMILIATED OR EMOTIONALLY ABUSED IN OTHER WAYS BY YOUR PARTNER OR EX-PARTNER?: PATIENT DECLINED

## 2025-02-14 SDOH — ECONOMIC STABILITY: INCOME INSECURITY
IN THE PAST 12 MONTHS HAS THE ELECTRIC, GAS, OIL, OR WATER COMPANY THREATENED TO SHUT OFF SERVICES IN YOUR HOME?: PATIENT DECLINED

## 2025-02-14 SDOH — SOCIAL STABILITY: SOCIAL INSECURITY
WITHIN THE LAST YEAR, HAVE YOU BEEN KICKED, HIT, SLAPPED, OR OTHERWISE PHYSICALLY HURT BY YOUR PARTNER OR EX-PARTNER?: PATIENT DECLINED

## 2025-02-14 SDOH — SOCIAL STABILITY: SOCIAL NETWORK: IN A TYPICAL WEEK, HOW MANY TIMES DO YOU TALK ON THE PHONE WITH FAMILY, FRIENDS, OR NEIGHBORS?: PATIENT DECLINED

## 2025-02-14 SDOH — SOCIAL STABILITY: SOCIAL NETWORK: HOW OFTEN DO YOU GET TOGETHER WITH FRIENDS OR RELATIVES?: PATIENT DECLINED

## 2025-02-14 SDOH — SOCIAL STABILITY: SOCIAL INSECURITY: WITHIN THE LAST YEAR, HAVE YOU BEEN AFRAID OF YOUR PARTNER OR EX-PARTNER?: PATIENT DECLINED

## 2025-02-14 SDOH — SOCIAL STABILITY: SOCIAL INSECURITY
WITHIN THE LAST YEAR, HAVE YOU BEEN RAPED OR FORCED TO HAVE ANY KIND OF SEXUAL ACTIVITY BY YOUR PARTNER OR EX-PARTNER?: PATIENT DECLINED

## 2025-02-14 SDOH — SOCIAL STABILITY: SOCIAL NETWORK: HOW OFTEN DO YOU ATTEND MEETINGS OF THE CLUBS OR ORGANIZATIONS YOU BELONG TO?: PATIENT DECLINED

## 2025-02-14 SDOH — SOCIAL STABILITY: SOCIAL INSECURITY: ARE YOU MARRIED, WIDOWED, DIVORCED, SEPARATED, NEVER MARRIED, OR LIVING WITH A PARTNER?: PATIENT DECLINED

## 2025-02-14 SDOH — ECONOMIC STABILITY: FOOD INSECURITY
WITHIN THE PAST 12 MONTHS, YOU WORRIED THAT YOUR FOOD WOULD RUN OUT BEFORE YOU GOT THE MONEY TO BUY MORE.: PATIENT DECLINED

## 2025-02-14 SDOH — HEALTH STABILITY: PHYSICAL HEALTH: ON AVERAGE, HOW MANY MINUTES DO YOU ENGAGE IN EXERCISE AT THIS LEVEL?: PATIENT UNABLE TO ANSWER

## 2025-02-14 ASSESSMENT — COGNITIVE AND FUNCTIONAL STATUS - GENERAL
MOBILITY SCORE: 24
DAILY ACTIVITIY SCORE: 24
MOBILITY SCORE: 24
DAILY ACTIVITIY SCORE: 24

## 2025-02-14 ASSESSMENT — PAIN SCALES - GENERAL
PAINLEVEL_OUTOF10: 4
PAINLEVEL_OUTOF10: 0 - NO PAIN

## 2025-02-14 ASSESSMENT — ENCOUNTER SYMPTOMS
COUGH: 1
SPEECH DIFFICULTY: 0
ADENOPATHY: 0
EYE DISCHARGE: 0
NECK STIFFNESS: 0
DIFFICULTY URINATING: 0
PALPITATIONS: 0
CHILLS: 0

## 2025-02-14 ASSESSMENT — PAIN - FUNCTIONAL ASSESSMENT
PAIN_FUNCTIONAL_ASSESSMENT: 0-10

## 2025-02-14 ASSESSMENT — PAIN DESCRIPTION - DESCRIPTORS: DESCRIPTORS: DISCOMFORT

## 2025-02-14 NOTE — CONSULTS
[facility]   [facility]  INITIAL CONSULT NOTE       Reason For Consult  Tylenol overdose    SUBJECTIVE     History Of Present Illness  Nic Horowitz goes by Rosibel is a 34 y.o. adult with a past medical history of depression and multiple suicide attemts, HTN, obesity admitted on 2/13/2025 as transfer from OSH for tylenol overdose. Hepatology is consulted for 30gm tylenol overdose.     Patient presented to OSH ED on 2/12/24 around 7:40PM after a 7PM 30gm ingestion of tylenol (60 500mg pills) in apparent suicide attempt after receiving a $2000 medical bill that was overwhelming. Poison control was called and by 8:20PM patient had received activated charcoal and NAC was started.     Initial tylenol level was 126 -> 132 -> 46 -> 10.5 -> <10 x2; LFTs remained normal throughout and initial INR 1.1 -> 1.3 and now 1.2. Patient received 24 hours of NAC prior to transfer to [facility].     Patients mentation is intact and is remorseful in regards to overdose.      Review of Systems  Negative but noted in HPI        Past Medical History:    Past Medical History:   Diagnosis Date    Bariatric surgery status 11/08/2018    Bariatric surgery status    Bipolar disorder, unspecified (Multi) 10/07/2021    Bipolar 1 disorder    Essential (primary) hypertension 12/21/2018    Elevated blood pressure reading in office with white coat syndrome, with diagnosis of hypertension    Morbid (severe) obesity due to excess calories (Multi) 11/10/2021    Morbid obesity    Morbid (severe) obesity due to excess calories (Multi) 11/10/2021    Morbid obesity    Other forms of dyspnea 12/06/2018    Exertional dyspnea    Other specified health status 10/19/2018    Known health problems: none    Other symptoms and signs involving the nervous system 10/19/2018    Suspected sleep apnea    Vitamin deficiency, unspecified 10/19/2018    Vitamin deficiency       Home Medications  Medications Prior to  Admission   Medication Sig Dispense Refill Last Dose/Taking    ergocalciferol (Vitamin D-2) 1.25 MG (23840 UT) capsule Take 1 capsule (50,000 Units) by mouth once a week.   Taking    ARIPiprazole lauroxil ER (Aristada) 1,064 mg/3.9 mL injection Inject 3.9 mL (1,064 mg) into the muscle 1 time. Every 2 months   2/5/2025    doxazosin (Cardura) 1 mg tablet Take 1 tablet (1 mg) by mouth once daily at bedtime.       escitalopram (Lexapro) 10 mg tablet Take 1 tablet (10 mg) by mouth once daily.       ferrous sulfate 325 (65 Fe) MG tablet Take 1 tablet (325 mg) by mouth every other day.       FOLIC ACID ORAL Take 1 tablet by mouth once daily.       hydrOXYzine HCL (Atarax) 50 mg tablet Take 1 tablet (50 mg) by mouth 3 times a day as needed for anxiety.            Surgical History:    Past Surgical History:   Procedure Laterality Date    OTHER SURGICAL HISTORY  02/18/2020    Oral surgery       Allergies:  No Known Allergies    Social History:    Social History     Socioeconomic History    Marital status: Single     Spouse name: Not on file    Number of children: Not on file    Years of education: Not on file    Highest education level: Not on file   Occupational History    Not on file   Tobacco Use    Smoking status: Never    Smokeless tobacco: Never   Substance and Sexual Activity    Alcohol use: Never    Drug use: Never    Sexual activity: Not on file   Other Topics Concern    Not on file   Social History Narrative    Not on file     Social Drivers of Health     Financial Resource Strain: High Risk (2/13/2025)    Overall Financial Resource Strain (CARDIA)     Difficulty of Paying Living Expenses: Very hard   Food Insecurity: Patient Declined (2/14/2025)    Hunger Vital Sign     Worried About Running Out of Food in the Last Year: Patient declined     Ran Out of Food in the Last Year: Patient declined   Transportation Needs: Unmet Transportation Needs (2/13/2025)    PRAPARE - Transportation     Lack of Transportation  (Medical): Yes     Lack of Transportation (Non-Medical): Yes   Physical Activity: Unknown (2/14/2025)    Exercise Vital Sign     Days of Exercise per Week: Not on file     Minutes of Exercise per Session: Patient unable to answer   Stress: Patient Declined (2/14/2025)    [employer] - Occupational Stress Questionnaire     Feeling of Stress : Patient declined   Social Connections: Patient Declined (2/14/2025)    Social Connection and Isolation Panel [NHANES]     Frequency of Communication with Friends and Family: Patient declined     Frequency of Social Gatherings with Friends and Family: Patient declined     Attends Alevism Services: Patient declined     Active Member of Clubs or Organizations: Patient declined     Attends Club or Organization Meetings: Patient declined     Marital Status: Patient declined   Intimate Partner Violence: Patient Declined (2/14/2025)    Humiliation, Afraid, Rape, and Kick questionnaire     Fear of Current or Ex-Partner: Patient declined     Emotionally Abused: Patient declined     Physically Abused: Patient declined     Sexually Abused: Patient declined   Housing Stability: High Risk (2/13/2025)    Housing Stability Vital Sign     Unable to Pay for Housing in the Last Year: No     Number of Times Moved in the Last Year: 2     Homeless in the Last Year: Yes       Family History:  No family history on file.    EXAM     Vitals:    Vitals:    02/14/25 0400 02/14/25 0556 02/14/25 0800 02/14/25 1223   BP:  131/77 148/86 (!) 152/96   BP Location:   Right arm    Patient Position:   Lying    Pulse: 64  65 89   Resp: 21  (!) 30 22   Temp:   36.6 °C (97.8 °F) 36.2 °C (97.2 °F)   TempSrc:   Temporal Temporal   SpO2:  94% 95% 95%   Weight:       Height:         Failed to redirect to the Timeline version of the [employer] SmartLink.    Intake/Output Summary (Last 24 hours) at 2/14/2025 1420  Last data filed at 2/14/2025 1300  Gross per 24 hour   Intake 480 ml   Output --    Net 480 ml         Physical Exam  General: well-nourished, no acute distress  HEENT: PERRLA, EOM intact, no scleral icterus, moist MM  Respiratory: CTA bilaterally, normal work of breathing  Cardiovascular: RRR, no murmurs/rubs/gallops  Abdomen: Soft, nontender, nondistended, bowel sounds present. No masses palpated  Extremities: no edema, no asterixis  Neuro: alert and oriented, CNII-XII grossly intact, moves all 4 extremities with no focal deficits    OBJECTIVE                                                                              Medications       Current Facility-Administered Medications:     cholecalciferol (Vitamin D-3) capsule 50,000 Units, 50,000 Units, oral, Weekly, LESLYE Walker, 50,000 Units at 02/14/25 0936    enoxaparin (Lovenox) syringe 60 mg, 60 mg, subcutaneous, q12h JONAH, LESLYE Walker, 60 mg at 02/14/25 0936    flu vaccine trivalent (PF) (Fluarix/Fluzone/Flulaval) 6 months or greater injection, 0.5 mL, intramuscular, During hospitalization, LESLYE Walker    multivitamin with minerals 1 tablet, 1 tablet, oral, Daily, LESLYE Walker, 1 tablet at 02/14/25 0936    pantoprazole (ProtoNix) injection 40 mg, 40 mg, intravenous, Daily, LESLYE Walker, 40 mg at 02/14/25 0936    polyethylene glycol (Glycolax, Miralax) packet 17 g, 17 g, oral, Daily PRN, LESLYE Walker                                                                            Labs     Results for orders placed or performed during the hospital encounter of 02/13/25 (from the past 24 hours)   CBC   Result Value Ref Range    WBC 8.2 4.4 - 11.3 x10*3/uL    nRBC 0.0 0.0 - 0.0 /100 WBCs    RBC 5.10 4.00 - 5.90 x10*6/uL    Hemoglobin 12.0 12.0 - 17.5 g/dL    Hematocrit 42.1 36.0 - 52.0 %    MCV 83 80 - 100 fL    MCH 23.5 (L) 26.0 - 34.0 pg    MCHC 28.5 (L) 32.0 - 36.0 g/dL    RDW 14.5 11.5 - 14.5 %    Platelets 217 150 - 450 x10*3/uL   Magnesium   Result Value Ref Range    Magnesium 1.88  1.60 - 2.40 mg/dL   Hepatic function panel   Result Value Ref Range    Albumin 3.5 3.4 - 5.0 g/dL    Bilirubin, Total 0.6 0.0 - 1.2 mg/dL    Bilirubin, Direct 0.2 0.0 - 0.3 mg/dL    Alkaline Phosphatase 51 33 - 120 U/L    ALT 18 7 - 52 U/L    AST 16 9 - 39 U/L    Total Protein 7.2 6.4 - 8.2 g/dL   Coagulation Screen   Result Value Ref Range    Protime 13.0 (H) 9.8 - 12.8 seconds    INR 1.2 (H) 0.9 - 1.1    aPTT 31 27 - 38 seconds   Phosphorus   Result Value Ref Range    Phosphorus 3.0 2.5 - 4.9 mg/dL   Basic Metabolic Panel   Result Value Ref Range    Glucose 81 74 - 99 mg/dL    Sodium 138 136 - 145 mmol/L    Potassium 3.7 3.5 - 5.3 mmol/L    Chloride 106 98 - 107 mmol/L    Bicarbonate 24 21 - 32 mmol/L    Anion Gap 12 10 - 20 mmol/L    Urea Nitrogen 8 6 - 23 mg/dL    Creatinine 1.05 0.50 - 1.30 mg/dL    eGFR >90 >60 mL/min/1.73m*2    Calcium 9.1 8.6 - 10.6 mg/dL                                                                              Imaging   None                                                                           GI Procedures     None           ASSESSMENT / PLAN                  ASSESSMENT/PLAN:  Nic (goes by Charles Carlos Manuel is a 34 y.o. adult with a past medical history of depression and multiple suicide attemts, HTN, obesity admitted on 2/13/2025 as transfer from OSH for tylenol overdose. Hepatology is consulted for 30gm tylenol overdose.     Patient presented to OSH ED on 2/12/24 around 7:40PM after a 7PM 30gm ingestion of tylenol (60 500mg pills) initial tylenol level 126 s/p activated charcoal (within 1.5 hours of initial ingestion) and NAC. Completed 24 hours of NAC and LFTs and mentation remained normal. No s/s of liver failure.     Recommendations  -Daily LFTs while admitted  -Agree with psychology consult for suicide attempt     Patient was seen& discussed with Dr. Forrest    Thank you for the consultation. Hepatology will SIGN OFF.  - During weekday hours of 7am- 5pm, please do not  hesitate to contact me on Epic Chat or page [phone] if there are any further questions   - After hours, on weekends, and on holidays, please page the [place] GI fellow [place] [phone]. Thank you.       Sheree Braswell MD  PGY4 Gastroenterology Fellow  [facility]

## 2025-02-14 NOTE — PROGRESS NOTES
Medical Intensive Care Stepdown Unit - Daily Progress Note   Subjective    Nic Horowitz is a 34 y.o. year old adult patient admitted on 2/13/2025 with Intentional Tylenol OD    Interval History:  Admitted to SDU overnight   Denies SOB, cough, N/V/D, dysuria  Endorses LUQ pain    Meds    Scheduled medications  cholecalciferol, 50,000 Units, oral, Weekly  enoxaparin, 60 mg, subcutaneous, q12h JONAH  influenza, 0.5 mL, intramuscular, During hospitalization  multivitamin with minerals, 1 tablet, oral, Daily  pantoprazole, 40 mg, intravenous, Daily      Continuous medications     PRN medications  PRN medications: polyethylene glycol     Objective    Blood pressure 131/77, pulse 64, temperature 36.3 °C (97.3 °F), temperature source Temporal, resp. rate 21, height 1.829 m (6'), weight (!) 207 kg (456 lb 5.6 oz), SpO2 94%.     Constitutional: obese pt in NAD, alert and cooperative  Eyes: PERRL, EOMI, no icterus   ENMT: mucous membranes moist, no apparent injury, no lesions seen  Head/Neck: Neck supple, no apparent injury  Respiratory/Thorax: Lungs CTA bilaterally, non-labored breathing, no cough, on RA  Cardiovascular: Regular, rate and rhythm, no murmurs, normal S1 and S2  Gastrointestinal: Nondistended, soft, mild tenderness LUQ, BS present x 4  Musculoskeletal: ROM intact, no joint swelling, normal strength  Extremities: normal extremities, no edema, contusions or wounds  Neurological: alert and oriented x 3, speech clear, follows commands appropriately, cr. n. II-XII intact, sensation grossly intact, motor 5/5 throughout  Skin: Warm and dry, no lesions, no rashes    No intake or output data in the 24 hours ending 02/14/25 0619  Labs:   Results from last 72 hours   Lab Units 02/13/25  1411 02/13/25  1000 02/12/25  1936   SODIUM mmol/L 135* 135* 134*   POTASSIUM mmol/L 3.5 3.2* 3.7   CHLORIDE mmol/L 102 102 101   CO2 mmol/L 25 27 26   BUN mg/dL 8 8 10   CREATININE mg/dL 0.99 0.98 1.03   GLUCOSE mg/dL 117* 120*  "139*   CALCIUM mg/dL 9.4 9.2 8.8   ANION GAP mmol/L 12 9* 11   EGFR mL/min/1.73m*2 >90 >90 >90      Results from last 72 hours   Lab Units 02/13/25  1411 02/12/25  1936   WBC AUTO x10*3/uL 8.4 10.0   HEMOGLOBIN g/dL 12.7 12.5*   HEMATOCRIT % 41.9 42.4   PLATELETS AUTO x10*3/uL 216 236   NEUTROS PCT AUTO %  --  63.3   LYMPHS PCT AUTO %  --  22.3   MONOS PCT AUTO %  --  8.4   EOS PCT AUTO %  --  5.3        Micro/ID:     No results found for: \"URINECULTURE\", \"BLOODCULT\", \"CSFCULTSMEAR\"       Summary of key imaging results from the last 24 hours  none    Assessment and Plan     Assessment: Nic Horowitz is a 34 y.o. year old adult patient who has transitioned to female (Rosibel) with a PMHx of: depression, previous suicide attempts involving overdose, BPD, trauma, nightmares, anxiety, HTN, obesity and non compliance with medications.  admitted on 2/13/2025 with Tylenol OD    Restraints: no    Summary for 02/14/25  :  Seen by GI, no interventions needed  Seen by psych, plan for inpatient psych admission  Medically cleared for discharge to inpatient psych    Plan:  NEUROLOGY/PSYCH:  Dx: depression, previous suicide attempts involving overdose with Effexor (1/30/25), Abilify/tylenol/Prazosin (10/3/24), Olanzapine (6/26/24), tylenol/Olanzapine (6/12), BPD, trauma, nightmares, anxiety.  Admitted for intentional Tylenol OD  Management:  A&Ox3  Home meds: Lexapro, Atarax, Effexor   Psych consulted, plan for inpatient psych admission  HOLD Doxazosin, Escitalopram, Atarax  1:1 sitter    CARDIOVASCULAR:  Dx: Hx of HTN  Management:  Home Meds: Cardura 1 mg tablet daily, Minipress 1 mg capsule, Aldactone 50 mg tablet daily   per records was previously on cardura     PULMONARY:  Dx: No acute issues  Management:  Stable on RA    GASTROENTEROLOGY:  Dx: Intentional Tylenol overdose, received activated charcoal & NAC at OSH  Tylenol level now <10. LFTs normal  Management:  GI consulted, no further interventions needed.  Diet: " regular  PPI  PRN Miralax    RENAL/GENITOURINARY:  Dx: Hx of Vit D def  Management:  Continue Vit D supp    ENDOCRINOLOGY:  Dx:  no acute issues  Management:  A1C: 5.4 (1/2025)    HEMATOLOGY:  Dx: No acute issues      MUSCULOSKELETAL/ SKIN:  Dx: no acute issues    INFECTIOUS DISEASE:  Dx: no acute issues      ICU/SDU Check List                  FEN  Fluids: PRN  Electrolytes: PRN  Nutrition: regular  Prophylaxis:  DVT ppx: Lovenox  GI ppx: PPI  Hardware:  Catheter: none  Drains: none  Lines: PIV  Social:  Code: Full Code    HPOA: Mother    Disposition: Medically cleared for inpatient psych  Discharge Planning: EPAT notified     Radha Kingsley APRN-CNP   02/14/25 at 6:19 AM     Pt discussed with Dr. Hu seen and examined. All labs, VS and previous plan of care reviewed.  I spent 45 minutes in the professional and overall care of this patient.      Disclaimer: Documentation completed with the information available at the time of input. The times in the chart may not be reflective of actual patient care times, interventions, or procedures. Documentation occurs after the physical care of the patient.

## 2025-02-14 NOTE — H&P
"History Of Present Illness:    Nic Horowitz is a 35 yo male who has transitioned to female \"Rosibel\" with a PMHx of: depression, previous suicide attempts involving overdose, BPD, trauma, nightmares, anxiety, HTN, obesity and non compliance with medications. She presented to OSH after reports taking #60  ES Tylenol capsules in a suicide attempt.  Initial labs were obtained.  She was given activated charcoal in ED.  Her UDS was negative but acute toxicology panel showed elevated acetaminophen level though ETOH and ASA not elevated.  Acetaminophen level on admit was 126.8 which uptrended to 132.5 at 4 hours, then downtrended to 46.1 at the 8 hours.  Following acetaminophen levels X2 were < 10.   She was started on IV NAK which was completed by the time of transfer.  She is complaining of mild headache, slight nausea, no vomiting since OSH ED, slight abd discomfort, denies fever, chills, back pain.   She endorses feeling hopeless and helpless saima since she lost her job.  Denies wanting to hurt self currently.  She was transferred to [facility] for hepatology input and is being admitted to the SDU for further care.    Upon arrival a sitter was immediately placed in room for 1:1 observation.     Last Recorded Vitals:  On admit;  36.3 - 77 - 20 - 161/102 96% on room air  Repeat /74    Past Medical History:  She has a past medical history of Bariatric surgery status (11/08/2018), Bipolar disorder, unspecified (Multi) (10/07/2021), Essential (primary) hypertension (12/21/2018), Morbid (severe) obesity due to excess calories (Multi) (11/10/2021), Morbid (severe) obesity due to excess calories (Multi) (11/10/2021), Other forms of dyspnea (12/06/2018), Other specified health status (10/19/2018), Other symptoms and signs involving the nervous system (10/19/2018), and Vitamin deficiency, unspecified (10/19/2018).      Past Surgical History:  She has a past surgical history that includes Other surgical history (02/18/2020).  Pt " denies previous bariatric surgery      Social History:  She reports that she has never smoked. She has never used smokeless tobacco. She reports that she does not drink alcohol and does not use drugs.    Family History:  No family history on file.     Allergies:  Patient has no known allergies.    Inpatient Medications:  Scheduled medications   Medication Dose Route Frequency    enoxaparin  60 mg subcutaneous q12h JONAH     PRN medications   Medication    polyethylene glycol     Continuous Medications   Medication Dose Last Rate     Outpatient Medications:  Current Outpatient Medications   Medication Instructions    ARIPiprazole lauroxil ER (ARISTADA) 1,064 mg, intramuscular, Once    cholecalciferol (VITAMIN D-3) 25 mcg, oral, Daily    doxazosin (CARDURA) 1 mg, oral, Nightly    escitalopram (LEXAPRO) 10 mg, oral, Daily    estradiol (ESTRACE) 2 mg, oral, 2 times daily    ferrous sulfate 325 (65 Fe) MG tablet 1 tablet, oral, Every other day    fluticasone (Flonase) 50 mcg/actuation nasal spray 1 spray, Each Nostril, Daily RT    hydrOXYzine HCL (ATARAX) 50 mg, oral, 3 times daily PRN    prazosin (MINIPRESS) 1 mg, oral, Nightly    spironolactone (ALDACTONE) 50 mg, oral, 2 times daily    traZODone (Desyrel) 50 mg tablet 1-2 tablets, oral, Nightly    venlafaxine XR (EFFEXOR-XR) 150 mg, oral, Daily   Per pt he has not been taking any medications due to cost and losing insurance coverage when he lost job.    Last known medication list:  Vit d  Flonase nasal spray  Estrogen  Ferrous sulfate  Lexapro 10  On 2/5 pt was given aristada lauroxil 675mg IM while inpatient    OSH labs:  BMP:  na 135/K=3.5/Cl 102/HCO3 25/BUN 8/ Cr 0.99/ glu 117  CBC: wbc 8.4/hgb 12.7/hct 41.9/plt 216  PT/INR= 14.5/1.3  Acetaminophen 2/13 at  1557 and 1411 = < 10  UDS neg  Acute toxicology panel: salicylate < 3;  etoh < 10  Iron 36, TIBC 292, sat 12.3%    No imaging at OSH    ROS:  +mild headache, + seizures years ago  + nausea  + depression, + anxiety,  "+ low mood      Physical Exam:  Gen: laying in bed in no distress  Skin: warm and dry; scars noted on left arm and right though c/w self cutting  HEENT:  PERRL, sclera non icteric, good dentation  Pulm: respiration even, non labored on room air; lungs CTA bilaterally  Cardiac: RRR S1S2 no murmur or gallop noted  GI: obese, + mild tenderness to palpation, non distended, no rebound tenderness  Ext: warm to touch, no edema noted  Psych: pleasant and cooperative,      Assessment/Plan   Nic \"Rosibel\" Carlos Manuel is a 35 yo male with PMHx of depression, transgender on estrogen therapy,  previous SA with overdoses, BPD, HTN, obesity and non compliance who is being admitted from OSH after suicide attempt with tylenol.      Neuro:  #mild headache  -cont to monitor    Cardiovascular:  #HTN  -pt denies hx of HTN but states that he was told he has \"white jacket\" htn  -per records was previously on cardura  -on arrival was 161/121  -repeat with sbp 134  -cont to monitor  -cont cardiac monitor  #obesity  -last lipid panel:  cho = 137; TG = 60; hDL = 53; LDL = 84    Pulmonary:  No active issues  -is on room air  -monitor oxygen saturation    Gastrointestinal  #acetaminophen overdose  -intentional OD  -30 grams ingested  -Tylenol level drawn on ED admit = 126  -subsequent levels 132.5 (peak) then downtrended to 46.1 at 8 hr margo  -last 2 levels were < 10  -he was given activated charcoal in ED  -UDS does not show elevated ETOH or ASA level  -started NAC  and completed therapy prior to transfer  -had approx 30 grams of NAK  -Last LFTs wnl  -AST to ALT ratio =0.5  -last INR=1.3  -repeat LFTs, coag  #bariatric surgery-listed in med records  -pt denies previous having surgery      /Renal:  #Vit D deficiency  -last level  =12 on 1/31  -cont vit d      Endocrine:  No active issues      Infectious Disease:  No active issues    Hematology:  #anemia  -last hgb 12.5, hct 42.4  -hgb in psat year 11-12  -previous history of being on iron  -B12 = " 415  -folate = 4.7  -repeat CBC    Psychiatry:  #Suicide Attempt  - hx of previous suicide attempts with overdoses  -overdosed on Tylenol #60 ES tablets (=30 grams)  -hx of OD on Effexor  -cont 1:1 sitter for suicide precautions  -Psych consulted (aware  #Depression/BPD/trauma/night terrors  -previously on prazosin  #non compliance  -pt states unable to afford medications since he lost insurnace      DVT PPx: lovenox, start in am  GI PPX: PPI    Plan of care and case discussed with Dr. Angel      Code Status:  Full Code    I spent 60 minutes in the professional and overall care of this patient.        Carla Long, APRN-CNP

## 2025-02-14 NOTE — NURSING NOTE
"Pt admitted 1930 2/13/25 and sitter on unit in room following immediately after. Room prepared beforehand with items at risk for harm removed from  system policy \"Suicide Risk Identification and Safety Management, CP-141\" list. Remaining items that are fixed were went over with patient observer. Pt belongings (cellphone, clothing, shoes, wallet) in patient belonging bag with patient label and secured in team meeting room. Pt put on tele monitor for heart rate with other vitals taken q4 via [employer]. Suicide risk assessment done along with admission paperwork. Hourly rounding done on patient, will continue to monitor and assess until end of shift.  "

## 2025-02-14 NOTE — PROGRESS NOTES
Pharmacy Medication History Review    Nic Horowitz is a 34 y.o. adult admitted for Acetaminophen overdose of undetermined intent, initial encounter. Pharmacy reviewed the patient's [id number] medications and allergies for accuracy.    Medications ADDED:  Ergocalciferol  Folic Acid  Medications CHANGED:  None  Medications REMOVED/NOT TAKING:   Cholecalciferol  Estradiol  Fluticasone  Prazosin  Spironolactone  Trazodone  Venlafaxine     The list below reflects the updated PTA list.   Prior to Admission Medications   Prescriptions Last Dose Informant   ARIPiprazole lauroxil ER ([place]) 1,064 mg/3.9 mL injection 2/5/2025 Self   Sig: Inject 3.9 mL (1,064 mg) into the muscle 1 time. Every 2 months  Next dose due 4/7/25    FOLIC ACID ORAL  Self   Sig: Take 1 tablet by mouth once daily.   doxazosin (Cardura) 1 mg tablet  Self   Sig: Take 1 tablet (1 mg) by mouth once daily at bedtime.  Discharge medications given to patient from [facility] on 2/7/25    ergocalciferol (Vitamin D-2) 1.25 MG (86331 UT) capsule  Self   Sig: Take 1 capsule (50,000 Units) by mouth once a week.  Wednesday    escitalopram (Lexapro) 10 mg tablet  Self   Sig: Take 1 tablet (10 mg) by mouth once daily.  Discharge medications given to patient from [facility] on 2/7/25    ferrous sulfate 325 (65 Fe) MG tablet  Self   Sig: Take 1 tablet (325 mg) by mouth every other day.   hydrOXYzine HCL (Atarax) 50 mg tablet  Self   Sig: Take 1 tablet (50 mg) by mouth 3 times a day as needed for anxiety.  Discharge medications given to patient from [facility] on 2/7/25       Facility-Administered Medications: None        The list below reflects the updated allergy list. Please review each documented allergy for additional clarification and justification.  Allergies  Reviewed by Jennifer Pa RN on 2/13/2025   No Known Allergies         Patient accepts M2B at discharge.     Sources:   Patient interview (good  "historian)  [facility]  Care Everywhere  [facility] Clinical Summary  Medication fill history  Call to [facility] [phone]  [facility] Discharge Summary 2/6/25    Additional Comments:  Patient did not know strength of Folic Acid taken.    Sania Tinsley, [facility]  Transitions of Care Pharmacist  02/14/25     Secure Chat preferred   If no response call [phone] or Vocera \"Med Rec\"    "

## 2025-02-14 NOTE — CONSULTS
Nutrition Note:     Consulted to see patient per RN admission risk screen for pt being obese.  Chart reviewed and events noted.  Checked EMR weight history with the following results:    2/13/25: 207kg  5/9/23: 197kg  11/11/22: 207kg    Pt's weight has been stable/uptrending in the last few years.  As pt is here for Tylenol overdose, not an appropriate setting to address obesity.  Will defer nutrition consult at this time and remain available as needed during the course of her admission via consult.       Time Spent (min): 15 minutes
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
## 2025-02-14 NOTE — CARE PLAN
Problem: Pain - Adult  Goal: Verbalizes/displays adequate comfort level or baseline comfort level  Outcome: Progressing     Problem: Safety - Adult  Goal: Free from fall injury  Outcome: Progressing     Problem: Nutrition  Goal: Nutrient intake appropriate for maintaining nutritional needs  Outcome: Progressing     Problem: Fall/Injury  Goal: Not fall by end of shift  Outcome: Progressing  Goal: Be free from injury by end of the shift  Outcome: Progressing  Goal: Verbalize understanding of personal risk factors for fall in the hospital  Outcome: Progressing  Goal: Verbalize understanding of risk factor reduction measures to prevent injury from fall in the home  Outcome: Progressing  Goal: Use assistive devices by end of the shift  Outcome: Progressing  Goal: Pace activities to prevent fatigue by end of the shift  Outcome: Progressing   The patient's goals for the shift include      The clinical goals for the shift include Pt will remain  safe from harm this shift.

## 2025-02-15 LAB
ALBUMIN SERPL BCP-MCNC: 3 G/DL (ref 3.4–5)
ALBUMIN SERPL BCP-MCNC: 3.1 G/DL (ref 3.4–5)
ALP SERPL-CCNC: 50 U/L (ref 33–120)
ALT SERPL W P-5'-P-CCNC: 17 U/L (ref 7–52)
ANION GAP SERPL CALC-SCNC: 14 MMOL/L (ref 10–20)
APTT PPP: 31 SECONDS (ref 27–38)
AST SERPL W P-5'-P-CCNC: 14 U/L (ref 9–39)
BILIRUB DIRECT SERPL-MCNC: 0.1 MG/DL (ref 0–0.3)
BILIRUB SERPL-MCNC: 0.5 MG/DL (ref 0–1.2)
BUN SERPL-MCNC: 9 MG/DL (ref 6–23)
CALCIUM SERPL-MCNC: 8.3 MG/DL (ref 8.6–10.6)
CHLORIDE SERPL-SCNC: 106 MMOL/L (ref 98–107)
CO2 SERPL-SCNC: 23 MMOL/L (ref 21–32)
CREAT SERPL-MCNC: 0.98 MG/DL (ref 0.5–1.3)
EGFRCR SERPLBLD CKD-EPI 2021: >90 ML/MIN/1.73M*2
ERYTHROCYTE [DISTWIDTH] IN BLOOD BY AUTOMATED COUNT: 14.6 % (ref 11.5–14.5)
GLUCOSE SERPL-MCNC: 80 MG/DL (ref 74–99)
HCT VFR BLD AUTO: 37.8 % (ref 36–52)
HGB BLD-MCNC: 11.3 G/DL (ref 12–17.5)
INR PPP: 1.1 (ref 0.9–1.1)
MCH RBC QN AUTO: 23 PG (ref 26–34)
MCHC RBC AUTO-ENTMCNC: 29.9 G/DL (ref 32–36)
MCV RBC AUTO: 77 FL (ref 80–100)
NRBC BLD-RTO: 0 /100 WBCS (ref 0–0)
PHOSPHATE SERPL-MCNC: 3.5 MG/DL (ref 2.5–4.9)
PHOSPHATE SERPL-MCNC: 3.6 MG/DL (ref 2.5–4.9)
PLATELET # BLD AUTO: 194 X10*3/UL (ref 150–450)
POTASSIUM SERPL-SCNC: 3.7 MMOL/L (ref 3.5–5.3)
PROT SERPL-MCNC: 6.6 G/DL (ref 6.4–8.2)
PROTHROMBIN TIME: 12.9 SECONDS (ref 9.8–12.8)
RBC # BLD AUTO: 4.91 X10*6/UL (ref 4–5.9)
SODIUM SERPL-SCNC: 139 MMOL/L (ref 136–145)
WBC # BLD AUTO: 8.2 X10*3/UL (ref 4.4–11.3)

## 2025-02-15 PROCEDURE — 85027 COMPLETE CBC AUTOMATED: CPT | Performed by: NURSE PRACTITIONER

## 2025-02-15 PROCEDURE — 2500000004 HC RX 250 GENERAL PHARMACY W/ HCPCS (ALT 636 FOR OP/ED): Performed by: NURSE PRACTITIONER

## 2025-02-15 PROCEDURE — 1100000001 HC PRIVATE ROOM DAILY

## 2025-02-15 PROCEDURE — 36415 COLL VENOUS BLD VENIPUNCTURE: CPT | Performed by: NURSE PRACTITIONER

## 2025-02-15 PROCEDURE — 85610 PROTHROMBIN TIME: CPT | Performed by: NURSE PRACTITIONER

## 2025-02-15 PROCEDURE — 82248 BILIRUBIN DIRECT: CPT | Performed by: NURSE PRACTITIONER

## 2025-02-15 PROCEDURE — 84100 ASSAY OF PHOSPHORUS: CPT | Performed by: NURSE PRACTITIONER

## 2025-02-15 PROCEDURE — G0378 HOSPITAL OBSERVATION PER HR: HCPCS

## 2025-02-15 PROCEDURE — 99232 SBSQ HOSP IP/OBS MODERATE 35: CPT | Performed by: STUDENT IN AN ORGANIZED HEALTH CARE EDUCATION/TRAINING PROGRAM

## 2025-02-15 PROCEDURE — 82040 ASSAY OF SERUM ALBUMIN: CPT | Performed by: NURSE PRACTITIONER

## 2025-02-15 PROCEDURE — 2500000001 HC RX 250 WO HCPCS SELF ADMINISTERED DRUGS (ALT 637 FOR MEDICARE OP): Performed by: NURSE PRACTITIONER

## 2025-02-15 PROCEDURE — 80076 HEPATIC FUNCTION PANEL: CPT | Performed by: NURSE PRACTITIONER

## 2025-02-15 RX ADMIN — ENOXAPARIN SODIUM 60 MG: 100 INJECTION SUBCUTANEOUS at 10:01

## 2025-02-15 RX ADMIN — Medication 1 TABLET: at 10:01

## 2025-02-15 RX ADMIN — PANTOPRAZOLE SODIUM 40 MG: 40 INJECTION, POWDER, FOR SOLUTION INTRAVENOUS at 10:01

## 2025-02-15 RX ADMIN — ENOXAPARIN SODIUM 60 MG: 100 INJECTION SUBCUTANEOUS at 21:13

## 2025-02-15 ASSESSMENT — COGNITIVE AND FUNCTIONAL STATUS - GENERAL
DAILY ACTIVITIY SCORE: 24
DAILY ACTIVITIY SCORE: 24
MOBILITY SCORE: 24
MOBILITY SCORE: 24

## 2025-02-15 ASSESSMENT — PAIN SCALES - GENERAL
PAINLEVEL_OUTOF10: 0 - NO PAIN

## 2025-02-15 ASSESSMENT — PAIN - FUNCTIONAL ASSESSMENT
PAIN_FUNCTIONAL_ASSESSMENT: 0-10

## 2025-02-15 ASSESSMENT — PAIN SCALES - WONG BAKER: WONGBAKER_NUMERICALRESPONSE: NO HURT

## 2025-02-15 NOTE — CARE PLAN
Problem: Pain - Adult  Goal: Verbalizes/displays adequate comfort level or baseline comfort level  Outcome: Progressing     Problem: Safety - Adult  Goal: Free from fall injury  Outcome: Progressing     Problem: Discharge Planning  Goal: Discharge to home or other facility with appropriate resources  Outcome: Progressing     Problem: Chronic Conditions and Co-morbidities  Goal: Patient's chronic conditions and co-morbidity symptoms are monitored and maintained or improved  Outcome: Progressing     Problem: Nutrition  Goal: Nutrient intake appropriate for maintaining nutritional needs  Outcome: Progressing     Problem: Fall/Injury  Goal: Not fall by end of shift  Outcome: Progressing  Goal: Be free from injury by end of the shift  Outcome: Progressing  Goal: Verbalize understanding of personal risk factors for fall in the hospital  Outcome: Progressing  Goal: Verbalize understanding of risk factor reduction measures to prevent injury from fall in the home  Outcome: Progressing  Goal: Use assistive devices by end of the shift  Outcome: Progressing  Goal: Pace activities to prevent fatigue by end of the shift  Outcome: Progressing   The patient's goals for the shift include      The clinical goals for the shift include Pt will remain free from injury or harm this shift.

## 2025-02-15 NOTE — HOSPITAL COURSE
"Nic Horowitz is a 35 yo male who has transitioned to female \"Rosibel\" with a PMHx of: depression, previous suicide attempts involving overdose, BPD, trauma, nightmares, anxiety, HTN, obesity and non compliance with medications. She presented to OSH after reports taking #60  ES Tylenol capsules in a suicide attempt.  Initial labs were obtained.  She was given activated charcoal in ED.  Her UDS was negative but acute toxicology panel showed elevated acetaminophen level though ETOH and ASA not elevated.  Acetaminophen level on admit was 126.8 which uptrended to 132.5 at 4 hours, then downtrended to 46.1 at the 8 hours.  Following acetaminophen levels X2 were < 10.   She was started on IV NAK which was completed by the time of transfer.  She is complaining of mild headache, slight nausea, no vomiting since OSH ED, slight abd discomfort, denies fever, chills, back pain.   She endorses feeling hopeless and helpless saima since she lost her job.  Denies wanting to hurt self currently.    She was transferred to [facility] for hepatology input and is being admitted to the MICU Stepdown Unit further care.    Upon arrival a sitter was immediately placed in room for 1:1 observation.   Hepatology consulted, completed 24 hours of NAC and LFTs and mentation remained normal; no s/s of liver failure.  Recommend daily LFTs while admitted; signed off.  Psych consulted, patient does currently meet criteria for inpatient psychiatric admission. Once patient is deemed medically cleared, please document in note that patient is MEDICALLY CLEARED and contact [facility] for referral at [phone], pager [phone]. Issue Application for Emergency Admission (pink slip) only after patient is accepted to an inpatient psychiatric unit and is ready to be discharged.     Patient Medically Cleared for inpatient psych on 2/14.   "

## 2025-02-15 NOTE — PROGRESS NOTES
Medical Intensive Care Stepdown Unit - Daily Progress Note   Subjective    Nic Horowitz is a 34 y.o. year old adult patient admitted on 2/13/2025 with Intentional Tylenol OD    Interval History:  No acute events over night    Meds    Scheduled medications  enoxaparin, 60 mg, subcutaneous, q12h JONAH  [START ON 2/19/2025] ergocalciferol, 1,250 mcg, oral, Weekly  influenza, 0.5 mL, intramuscular, During hospitalization  multivitamin with minerals, 1 tablet, oral, Daily  pantoprazole, 40 mg, intravenous, Daily      Continuous medications     PRN medications  PRN medications: polyethylene glycol     Objective    Blood pressure (!) 148/92, pulse 88, temperature 36.5 °C (97.7 °F), temperature source Temporal, resp. rate 20, height 1.829 m (6'), weight (!) 207 kg (456 lb 5.6 oz), SpO2 95%.     Constitutional: obese pt in NAD, alert and cooperative  Eyes: PERRL, EOMI, no icterus   ENMT: mucous membranes moist, no apparent injury, no lesions seen  Head/Neck: Neck supple, no apparent injury  Respiratory/Thorax: Lungs CTA bilaterally, non-labored breathing, no cough, on RA  Cardiovascular: Regular, rate and rhythm, no murmurs, normal S1 and S2  Gastrointestinal: Nondistended, soft, mild tenderness LUQ, BS present x 4  Musculoskeletal: ROM intact, no joint swelling, normal strength  Extremities: normal extremities, no edema, contusions or wounds  Neurological: alert and oriented x 3, speech clear, follows commands appropriately, motor 5/5 throughout  Skin: Warm and dry, no lesions, no rashes      Intake/Output Summary (Last 24 hours) at 2/15/2025 0754  Last data filed at 2/14/2025 2000  Gross per 24 hour   Intake 480 ml   Output --   Net 480 ml     Labs:   Results from last 72 hours   Lab Units 02/15/25  0353 02/14/25  0548 02/13/25  1411 02/13/25  1000   SODIUM mmol/L  --  138 135* 135*   POTASSIUM mmol/L  --  3.7 3.5 3.2*   CHLORIDE mmol/L  --  106 102 102   CO2 mmol/L  --  24 25 27   BUN mg/dL  --  8 8 8   CREATININE  "mg/dL  --  1.05 0.99 0.98   GLUCOSE mg/dL  --  81 117* 120*   CALCIUM mg/dL  --  9.1 9.4 9.2   ANION GAP mmol/L  --  12 12 9*   EGFR mL/min/1.73m*2  --  >90 >90 >90   PHOSPHORUS mg/dL 3.6 3.0  --   --       Results from last 72 hours   Lab Units 02/15/25  0353 02/14/25  0548 02/13/25  1411 02/12/25  1936   WBC AUTO x10*3/uL 8.2 8.2 8.4 10.0   HEMOGLOBIN g/dL 11.3* 12.0 12.7 12.5*   HEMATOCRIT % 37.8 42.1 41.9 42.4   PLATELETS AUTO x10*3/uL 194 217 216 236   NEUTROS PCT AUTO %  --   --   --  63.3   LYMPHS PCT AUTO %  --   --   --  22.3   MONOS PCT AUTO %  --   --   --  8.4   EOS PCT AUTO %  --   --   --  5.3        Micro/ID:     No results found for: \"URINECULTURE\", \"BLOODCULT\", \"CSFCULTSMEAR\"       Summary of key imaging results from the last 24 hours  none    Assessment and Plan     Assessment: Nic Horowitz is a 34 y.o. year old adult patient who has transitioned to female (Rosibel) with a PMHx of: depression, previous suicide attempts involving overdose, BPD, trauma, nightmares, anxiety, HTN, obesity and non compliance with medications.  admitted on 2/13/2025 with Tylenol OD    Restraints: no    Summary for 02/15/25  :  Seen by GI, no interventions needed  Seen by psych, plan for inpatient psych admission  Medically cleared for discharge to inpatient psych    Plan:  NEUROLOGY/PSYCH:  Dx: depression, previous suicide attempts involving overdose with Effexor (1/30/25), Abilify/tylenol/Prazosin (10/3/24), Olanzapine (6/26/24), tylenol/Olanzapine (6/12), BPD, trauma, nightmares, anxiety.  Admitted for intentional Tylenol OD  Management:  A&Ox3  Home meds: Lexapro, Atarax, Effexor   Psych consulted, plan for inpatient psych admission  HOLD Doxazosin, Escitalopram, Atarax  1:1 sitter    CARDIOVASCULAR:  Dx: Hx of HTN  Management:  Home Meds: Cardura 1 mg tablet daily, Minipress 1 mg capsule, Aldactone 50 mg tablet daily   per records was previously on cardura     PULMONARY:  Dx: No acute " issues  Management:  Stable on RA    GASTROENTEROLOGY:  Dx: Intentional Tylenol overdose, received activated charcoal & NAC at OSH  Tylenol level now <10. LFTs normal  Management:  GI consulted, no further interventions needed.  Diet: regular  PPI  PRN Miralax    RENAL/GENITOURINARY:  Dx: Hx of Vit D def  Management:  Continue Vit D supp    ENDOCRINOLOGY:  Dx:  no acute issues  Management:  A1C: 5.4 (1/2025)    HEMATOLOGY:  Dx: No acute issues      MUSCULOSKELETAL/ SKIN:  Dx: no acute issues    INFECTIOUS DISEASE:  Dx: no acute issues      ICU/SDU Check List                  FEN  Fluids: PRN  Electrolytes: PRN  Nutrition: regular  Prophylaxis:  DVT ppx: Lovenox  GI ppx: PPI  Hardware:  Catheter: none  Drains: none  Lines: PIV  Social:  Code: Full Code    HPOA: Mother    Disposition: Medically cleared for inpatient psych, if no bed available will transfer to medicine floor with 1:1 sitter as per psych recs  Discharge Planning: EPAT notified     RAFAEL Garcia-CNP   02/15/25 at 7:54 AM     Pt discussed with Dr. Hu seen and examined. All labs, VS and previous plan of care reviewed.  I spent 45 minutes in the professional and overall care of this patient.      Disclaimer: Documentation completed with the information available at the time of input. The times in the chart may not be reflective of actual patient care times, interventions, or procedures. Documentation occurs after the physical care of the patient.

## 2025-02-15 NOTE — NURSING NOTE
Report called to Nina VILLALBA.  Pt transferring to [facility].  Pt remains with 1:1 safety sitter.  Denies any pain or distress.  PIV flushed.  Will transfer patient with all belongings.

## 2025-02-15 NOTE — CARE PLAN
The patient's goals for the shift include  be transferred to other inpatient facility     The clinical goals for the shift include Pt will remain  safe from harm this shift. Patient  at bedside and patient room evaluated for safety.       Problem: Pain - Adult  Goal: Verbalizes/displays adequate comfort level or baseline comfort level  Outcome: Progressing     Problem: Safety - Adult  Goal: Free from fall injury  Outcome: Progressing     Problem: Discharge Planning  Goal: Discharge to home or other facility with appropriate resources  Outcome: Progressing     Problem: Chronic Conditions and Co-morbidities  Goal: Patient's chronic conditions and co-morbidity symptoms are monitored and maintained or improved  Outcome: Progressing     Problem: Nutrition  Goal: Nutrient intake appropriate for maintaining nutritional needs  Outcome: Progressing     Problem: Fall/Injury  Goal: Not fall by end of shift  Outcome: Progressing  Goal: Be free from injury by end of the shift  Outcome: Progressing  Goal: Verbalize understanding of personal risk factors for fall in the hospital  Outcome: Progressing  Goal: Verbalize understanding of risk factor reduction measures to prevent injury from fall in the home  Outcome: Progressing  Goal: Use assistive devices by end of the shift  Outcome: Progressing  Goal: Pace activities to prevent fatigue by end of the shift  Outcome: Progressing
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
## 2025-02-16 VITALS
HEIGHT: 72 IN | BODY MASS INDEX: 42.66 KG/M2 | OXYGEN SATURATION: 94 % | RESPIRATION RATE: 18 BRPM | SYSTOLIC BLOOD PRESSURE: 110 MMHG | HEART RATE: 75 BPM | DIASTOLIC BLOOD PRESSURE: 73 MMHG | TEMPERATURE: 97.3 F | WEIGHT: 315 LBS

## 2025-02-16 LAB
ALBUMIN SERPL BCP-MCNC: 3.4 G/DL (ref 3.4–5)
ALP SERPL-CCNC: 54 U/L (ref 33–120)
ALT SERPL W P-5'-P-CCNC: 15 U/L (ref 7–52)
ANION GAP SERPL CALC-SCNC: 12 MMOL/L (ref 10–20)
APTT PPP: 31 SECONDS (ref 27–38)
AST SERPL W P-5'-P-CCNC: 12 U/L (ref 9–39)
BILIRUB DIRECT SERPL-MCNC: 0.2 MG/DL (ref 0–0.3)
BILIRUB SERPL-MCNC: 0.5 MG/DL (ref 0–1.2)
BUN SERPL-MCNC: 8 MG/DL (ref 6–23)
CALCIUM SERPL-MCNC: 8.8 MG/DL (ref 8.6–10.6)
CHLORIDE SERPL-SCNC: 102 MMOL/L (ref 98–107)
CO2 SERPL-SCNC: 26 MMOL/L (ref 21–32)
CREAT SERPL-MCNC: 0.91 MG/DL (ref 0.5–1.3)
EGFRCR SERPLBLD CKD-EPI 2021: >90 ML/MIN/1.73M*2
ERYTHROCYTE [DISTWIDTH] IN BLOOD BY AUTOMATED COUNT: 14.4 % (ref 11.5–14.5)
GLUCOSE SERPL-MCNC: 141 MG/DL (ref 74–99)
HCT VFR BLD AUTO: 38.4 % (ref 36–52)
HGB BLD-MCNC: 11.8 G/DL (ref 12–17.5)
INR PPP: 1.2 (ref 0.9–1.1)
MCH RBC QN AUTO: 23.7 PG (ref 26–34)
MCHC RBC AUTO-ENTMCNC: 30.7 G/DL (ref 32–36)
MCV RBC AUTO: 77 FL (ref 80–100)
NRBC BLD-RTO: 0 /100 WBCS (ref 0–0)
PHOSPHATE SERPL-MCNC: 3.3 MG/DL (ref 2.5–4.9)
PLATELET # BLD AUTO: 194 X10*3/UL (ref 150–450)
POTASSIUM SERPL-SCNC: 3.7 MMOL/L (ref 3.5–5.3)
PROT SERPL-MCNC: 7.1 G/DL (ref 6.4–8.2)
PROTHROMBIN TIME: 13.3 SECONDS (ref 9.8–12.8)
RBC # BLD AUTO: 4.97 X10*6/UL (ref 4–5.9)
SODIUM SERPL-SCNC: 136 MMOL/L (ref 136–145)
WBC # BLD AUTO: 7.8 X10*3/UL (ref 4.4–11.3)

## 2025-02-16 PROCEDURE — 36415 COLL VENOUS BLD VENIPUNCTURE: CPT | Performed by: NURSE PRACTITIONER

## 2025-02-16 PROCEDURE — 99232 SBSQ HOSP IP/OBS MODERATE 35: CPT | Performed by: INTERNAL MEDICINE

## 2025-02-16 PROCEDURE — 80053 COMPREHEN METABOLIC PANEL: CPT | Performed by: NURSE PRACTITIONER

## 2025-02-16 PROCEDURE — 85027 COMPLETE CBC AUTOMATED: CPT | Performed by: NURSE PRACTITIONER

## 2025-02-16 PROCEDURE — 2500000001 HC RX 250 WO HCPCS SELF ADMINISTERED DRUGS (ALT 637 FOR MEDICARE OP): Performed by: NURSE PRACTITIONER

## 2025-02-16 PROCEDURE — G0378 HOSPITAL OBSERVATION PER HR: HCPCS

## 2025-02-16 PROCEDURE — 2500000004 HC RX 250 GENERAL PHARMACY W/ HCPCS (ALT 636 FOR OP/ED): Performed by: NURSE PRACTITIONER

## 2025-02-16 PROCEDURE — 82248 BILIRUBIN DIRECT: CPT | Performed by: NURSE PRACTITIONER

## 2025-02-16 PROCEDURE — 2500000001 HC RX 250 WO HCPCS SELF ADMINISTERED DRUGS (ALT 637 FOR MEDICARE OP): Performed by: INTERNAL MEDICINE

## 2025-02-16 PROCEDURE — 99232 SBSQ HOSP IP/OBS MODERATE 35: CPT | Performed by: STUDENT IN AN ORGANIZED HEALTH CARE EDUCATION/TRAINING PROGRAM

## 2025-02-16 PROCEDURE — 84100 ASSAY OF PHOSPHORUS: CPT | Performed by: NURSE PRACTITIONER

## 2025-02-16 PROCEDURE — 1100000001 HC PRIVATE ROOM DAILY

## 2025-02-16 PROCEDURE — 85610 PROTHROMBIN TIME: CPT | Performed by: NURSE PRACTITIONER

## 2025-02-16 PROCEDURE — 80048 BASIC METABOLIC PNL TOTAL CA: CPT | Performed by: NURSE PRACTITIONER

## 2025-02-16 RX ORDER — PANTOPRAZOLE SODIUM 40 MG/1
40 TABLET, DELAYED RELEASE ORAL
Status: DISCONTINUED | OUTPATIENT
Start: 2025-02-16 | End: 2025-02-17 | Stop reason: HOSPADM

## 2025-02-16 RX ADMIN — ENOXAPARIN SODIUM 60 MG: 100 INJECTION SUBCUTANEOUS at 09:22

## 2025-02-16 RX ADMIN — Medication 1 TABLET: at 09:22

## 2025-02-16 RX ADMIN — ENOXAPARIN SODIUM 60 MG: 100 INJECTION SUBCUTANEOUS at 21:10

## 2025-02-16 RX ADMIN — PANTOPRAZOLE SODIUM 40 MG: 40 TABLET, DELAYED RELEASE ORAL at 09:22

## 2025-02-16 ASSESSMENT — PAIN SCALES - WONG BAKER: WONGBAKER_NUMERICALRESPONSE: NO HURT

## 2025-02-16 ASSESSMENT — COGNITIVE AND FUNCTIONAL STATUS - GENERAL
DAILY ACTIVITIY SCORE: 24
MOBILITY SCORE: 24
DAILY ACTIVITIY SCORE: 24

## 2025-02-16 ASSESSMENT — PAIN SCALES - GENERAL
PAINLEVEL_OUTOF10: 0 - NO PAIN
PAINLEVEL_OUTOF10: 0 - NO PAIN

## 2025-02-16 ASSESSMENT — COLUMBIA-SUICIDE SEVERITY RATING SCALE - C-SSRS
2. HAVE YOU ACTUALLY HAD ANY THOUGHTS OF KILLING YOURSELF?: NO
1. SINCE LAST CONTACT, HAVE YOU WISHED YOU WERE DEAD OR WISHED YOU COULD GO TO SLEEP AND NOT WAKE UP?: NO

## 2025-02-16 ASSESSMENT — PAIN - FUNCTIONAL ASSESSMENT: PAIN_FUNCTIONAL_ASSESSMENT: 0-10

## 2025-02-16 NOTE — NURSING NOTE
"ESTEBAN Initial Assessment:     ESTEBAN services following due to high risk C-SSRS on 2/13.      02/13/25 0376   [place] Suicide Severity Rating Scale (Screener/Recent Self-Report)   1. Wish to be Dead (Past 1 Month) Y   2. Non-Specific Active Suicidal Thoughts (Past 1 Month) Y   3. Active Suicidal Ideation with any Methods (Not Plan) Without Intent to Act (Past 1 Month) Y   4. Active Suicidal Ideation with Some Intent to Act, Without Specific Plan (Past 1 Month) Y   5. Active Suicidal Ideation with Specific Plan and Intent (Past 1 Month) Y   6. Suicidal Behavior (Lifetime) Y   6. Suicidal Behavior (3 Months) Y   6. Suicidal Behavior (Description) suicide attempt   Calculated C-SSRS Risk Score (Lifetime/Recent) High Risk     Per bedside sitter, patient has not had any suicidal behaviors this shift. The patient has been sleeping on and off, has gotten up to the restroom, and ate dinner. On assessment, patient is resting in bed quietly. Patient was not arousal by voice. ESTEBAN RN did not disturb patient in order to promote rest during hospitalization. Unable to update C-SSRS at this time. Notified bedside nurse of need for re-screen. ESTEBAN RN able to come back to unit to re-screen if patient wakes up tonight and bedside nurse is unable.    ESTEBAN services available to patient and staff 24/7 throughout hospitalization. Please secure chat \"ESTEBAN\" with any questions or concerns.   "
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
## 2025-02-16 NOTE — CARE PLAN
Problem: Pain - Adult  Goal: Verbalizes/displays adequate comfort level or baseline comfort level  Outcome: Progressing   The patient's goals for the shift include      The clinical goals for the shift include Pt will be free from harm or injuries during this shift

## 2025-02-16 NOTE — PROGRESS NOTES
Nic Horowitz is a 34 y.o. adult on day 3 of admission presenting with Acetaminophen overdose of undetermined intent, initial encounter.      Subjective     No events over night.     Patient is seen on Lk 20. ( Sitter at bed side ).     Reports no new physical complaints.       Objective     Last Recorded Vitals  /67   Pulse 77   Temp 36.5 °C (97.7 °F) (Temporal)   Resp 16   Wt (!) 207 kg (456 lb 5.6 oz)   SpO2 95%   Intake/Output last 3 Shifts:  No intake or output data in the 24 hours ending 02/16/25 1100    Admission Weight  Weight: (!) 207 kg (456 lb 5.6 oz) (02/13/25 1926)    Daily Weight  02/13/25 : (!) 207 kg (456 lb 5.6 oz)    Image Results  Electrocardiogram, 12-lead  Normal sinus rhythm  Low voltage QRS  Borderline ECG  When compared with ECG of 12-FEB-2025 22:07, (unconfirmed)  QRS voltage has decreased  Confirmed by Dean Bermudez ([id number]) on 2/14/2025 12:57:59 PM    Vitals:    02/16/25 0416   BP: 109/67   Pulse: 77   Resp: 16   Temp: 36.5 °C (97.7 °F)   SpO2: 95%       Physical Exam  Constitutional:       Appearance: She is obese.      Comments: Comfortable on RA at rest.    HENT:      Head: Normocephalic.      Nose: Nose normal.      Mouth/Throat:      Mouth: Mucous membranes are moist.   Eyes:      Extraocular Movements: Extraocular movements intact.      Pupils: Pupils are equal, round, and reactive to light.   Cardiovascular:      Rate and Rhythm: Normal rate and regular rhythm.      Heart sounds: Normal heart sounds. No murmur heard.  Pulmonary:      Effort: No respiratory distress.      Breath sounds: Normal breath sounds. No wheezing.   Abdominal:      General: There is no distension.      Palpations: Abdomen is soft.      Tenderness: There is no abdominal tenderness.   Musculoskeletal:         General: Normal range of motion.      Cervical back: Normal range of motion.   Skin:     General: Skin is warm.   Neurological:      General: No focal deficit present.      Mental Status:  She is alert and oriented to person, place, and time.   Psychiatric:      Comments: Answering appropriately.          Relevant Results  Scheduled medications  enoxaparin, 60 mg, subcutaneous, q12h [facility]  [START ON 2/19/2025] ergocalciferol, 1,250 mcg, oral, Weekly  influenza, 0.5 mL, intramuscular, During hospitalization  multivitamin with minerals, 1 tablet, oral, Daily  pantoprazole, 40 mg, oral, Daily before breakfast      Continuous medications     PRN medications  PRN medications: polyethylene glycol    Results for orders placed or performed during the hospital encounter of 02/13/25 (from the past 24 hours)   CBC   Result Value Ref Range    WBC 7.8 4.4 - 11.3 x10*3/uL    nRBC 0.0 0.0 - 0.0 /100 WBCs    RBC 4.97 4.00 - 5.90 x10*6/uL    Hemoglobin 11.8 (L) 12.0 - 17.5 g/dL    Hematocrit 38.4 36.0 - 52.0 %    MCV 77 (L) 80 - 100 fL    MCH 23.7 (L) 26.0 - 34.0 pg    MCHC 30.7 (L) 32.0 - 36.0 g/dL    RDW 14.4 11.5 - 14.5 %    Platelets 194 150 - 450 x10*3/uL   Hepatic function panel   Result Value Ref Range    Albumin 3.4 3.4 - 5.0 g/dL    Bilirubin, Total 0.5 0.0 - 1.2 mg/dL    Bilirubin, Direct 0.2 0.0 - 0.3 mg/dL    Alkaline Phosphatase 54 33 - 120 U/L    ALT 15 7 - 52 U/L    AST 12 9 - 39 U/L    Total Protein 7.1 6.4 - 8.2 g/dL   Coagulation Screen   Result Value Ref Range    Protime 13.3 (H) 9.8 - 12.8 seconds    INR 1.2 (H) 0.9 - 1.1    aPTT 31 27 - 38 seconds   Phosphorus   Result Value Ref Range    Phosphorus 3.3 2.5 - 4.9 mg/dL   Basic Metabolic Panel   Result Value Ref Range    Glucose 141 (H) 74 - 99 mg/dL    Sodium 136 136 - 145 mmol/L    Potassium 3.7 3.5 - 5.3 mmol/L    Chloride 102 98 - 107 mmol/L    Bicarbonate 26 21 - 32 mmol/L    Anion Gap 12 10 - 20 mmol/L    Urea Nitrogen 8 6 - 23 mg/dL    Creatinine 0.91 0.50 - 1.30 mg/dL    eGFR >90 >60 mL/min/1.73m*2    Calcium 8.8 8.6 - 10.6 mg/dL       Assessment/Plan      Nic Brennon Hujorge is a 34 year old with PMH of  obesity BMI 61, transition to  female (Rosibel),  depression, previous suicide attempts involving overdose, BPD, trauma, nightmares, anxiety, HTN and non compliance with medications is admitted to [facility]U on 2/13/2025 with Tylenol overdose. He is transferred from [facility] to [facility].           Depression, previous suicide attempts:   involving overdose with Effexor (1/30/25), Abilify/tylenol/Prazosin (10/3/24), Olanzapine (6/26/24), tylenol/Olanzapine (6/12), BPD, trauma, nightmares, anxiety.  Admitted for intentional Tylenol OD  Home meds: Lexapro, Atarax, Effexor   Psych consulted, recs appreciated, advised inpatient psych admission  HOLD Doxazosin, Escitalopram, Atarax  Continue 1:1 sitter       Hx of HTN  Home Meds: Cardura 1 mg tablet daily, Minipress 1 mg capsule, Aldactone 50 mg tablet daily   Holding meds  BP normal without any meds.      Dx: Hx of Vit D def  Continue Vit D supp        Prophylaxis:  DVT ppx: Lovenox  GI ppx: PPI    Code: Full Code    HPOA: Mother     Disposition: Medically cleared for inpatient psych transfer. EPAT is informed.     Jonathan Catherine MD

## 2025-02-16 NOTE — PROGRESS NOTES
Nic Horowitz is a 34 y.o. y.o. adult on day 3 of admission presenting with Acetaminophen overdose of undetermined intent, initial encounter [T39.1X4A].     Subjective   Chart reviewed; noted that patient is medically cleared and continues to meet criteria for inpatient psychiatry. EPAT was consulted and currently pending an accepting facility. Patient was assessed by mobile crisis due to being uninsured and was approved for funding. Current barrier for an accepting facility is patients weight (465 lbs) & uninsured status/mobile crisis funding.    TCC/SW will continue to follow.    - Eliza BORWN, MA, LSW  Care Transitions   [facility] Secure Chat or [phone]

## 2025-02-16 NOTE — CARE PLAN
The patient's goals for the shift include      The clinical goals for the shift include Patient will be free of harm and injuries throughout this shift      Patient remained safe and stable this shift, free of harm and injuries, no acute events this shift

## 2025-02-17 VITALS
BODY MASS INDEX: 42.66 KG/M2 | HEART RATE: 85 BPM | OXYGEN SATURATION: 91 % | RESPIRATION RATE: 18 BRPM | TEMPERATURE: 97.9 F | WEIGHT: 315 LBS | HEIGHT: 72 IN | SYSTOLIC BLOOD PRESSURE: 134 MMHG | DIASTOLIC BLOOD PRESSURE: 90 MMHG

## 2025-02-17 PROCEDURE — 2500000001 HC RX 250 WO HCPCS SELF ADMINISTERED DRUGS (ALT 637 FOR MEDICARE OP): Performed by: INTERNAL MEDICINE

## 2025-02-17 PROCEDURE — 2500000001 HC RX 250 WO HCPCS SELF ADMINISTERED DRUGS (ALT 637 FOR MEDICARE OP): Performed by: NURSE PRACTITIONER

## 2025-02-17 PROCEDURE — 99232 SBSQ HOSP IP/OBS MODERATE 35: CPT | Performed by: STUDENT IN AN ORGANIZED HEALTH CARE EDUCATION/TRAINING PROGRAM

## 2025-02-17 PROCEDURE — 2500000004 HC RX 250 GENERAL PHARMACY W/ HCPCS (ALT 636 FOR OP/ED): Performed by: NURSE PRACTITIONER

## 2025-02-17 PROCEDURE — G0378 HOSPITAL OBSERVATION PER HR: HCPCS

## 2025-02-17 PROCEDURE — 99239 HOSP IP/OBS DSCHRG MGMT >30: CPT | Performed by: INTERNAL MEDICINE

## 2025-02-17 RX ORDER — ESCITALOPRAM OXALATE 5 MG/1
5 TABLET ORAL DAILY
Status: DISCONTINUED | OUTPATIENT
Start: 2025-02-17 | End: 2025-02-17 | Stop reason: HOSPADM

## 2025-02-17 RX ORDER — ESCITALOPRAM OXALATE 10 MG/1
5 TABLET ORAL DAILY
Start: 2025-02-17

## 2025-02-17 RX ORDER — DOXAZOSIN 1 MG/1
1 TABLET ORAL NIGHTLY
Status: DISCONTINUED | OUTPATIENT
Start: 2025-02-17 | End: 2025-02-17 | Stop reason: HOSPADM

## 2025-02-17 RX ADMIN — ENOXAPARIN SODIUM 60 MG: 100 INJECTION SUBCUTANEOUS at 09:00

## 2025-02-17 RX ADMIN — Medication 1 TABLET: at 09:00

## 2025-02-17 RX ADMIN — ESCITALOPRAM 5 MG: 5 TABLET, FILM COATED ORAL at 12:27

## 2025-02-17 RX ADMIN — PANTOPRAZOLE SODIUM 40 MG: 40 TABLET, DELAYED RELEASE ORAL at 06:25

## 2025-02-17 ASSESSMENT — COGNITIVE AND FUNCTIONAL STATUS - GENERAL
DAILY ACTIVITIY SCORE: 24
MOBILITY SCORE: 24

## 2025-02-17 ASSESSMENT — PAIN SCALES - GENERAL
PAINLEVEL_OUTOF10: 0 - NO PAIN
PAINLEVEL_OUTOF10: 0 - NO PAIN

## 2025-02-17 NOTE — CARE PLAN
Problem: Pain - Adult  Goal: Verbalizes/displays adequate comfort level or baseline comfort level  Outcome: Progressing   The patient's goals for the shift include      The clinical goals for the shift include Pt will be free from harm/injuries during this shift

## 2025-02-17 NOTE — NURSING NOTE
01/17/2025 Nursing Note. Patient discharged to home today  RN discussed discharge instructions with Patient  patient verbalized understanding and copy of AVS was given to the Patient

## 2025-02-17 NOTE — DISCHARGE SUMMARY
Discharge Diagnosis  Acetaminophen overdose of undetermined intent, initial encounter    Issues Requiring Follow-Up  PCP follow up for Health Maintenance.     Discharge Meds     Medication List      START taking these medications     multivitamin with minerals tablet; Take 1 tablet by mouth once daily.     CHANGE how you take these medications     escitalopram 10 mg tablet; Commonly known as: Lexapro; Take 0.5 tablets   (5 mg) by mouth once daily.; What changed: how much to take     CONTINUE taking these medications     ARIPiprazole lauroxil ER 1,064 mg/3.9 mL injection; Commonly known as:   Aristada   doxazosin 1 mg tablet; Commonly known as: Cardura   ergocalciferol 1.25 MG (31177 UT) capsule; Commonly known as: Vitamin   D-2   FOLIC ACID ORAL     STOP taking these medications     ferrous sulfate (325 mg ferrous sulfate) tablet   hydrOXYzine HCL 50 mg tablet; Commonly known as: Atarax       Test Results Pending At Discharge  Pending Labs       No current pending labs.            Hospital Course  Nic Horowitz is a 34 year old with PMH of obesity BMI 61, transgender to female (Rosibel),  depression, previous suicide attempts involving overdose, BPD, trauma, nightmares, anxiety, HTN and non adherence with medications was admitted to [facility] on 2/13/2025 with Tylenol overdose. He was transferred from [facility] to [facility]. LFT's improved and hepatology recommended no further treatment.        Depression, previous suicide attempts:  H/o overdose with Effexor (1/30/25), Abilify/tylenol/Prazosin (10/3/24), Olanzapine (6/26/24), tylenol/Olanzapine (6/12), BPD, trauma, nightmares, anxiety.  Admitted for intentional Tylenol OD  Home meds: Lexapro, Atarax, Effexor   Psych consulted, recs appreciated,   Psych cleared the patient with safety plan.   Psych advised to continue home Lexapro 5 mg daily,  and Doxazocin 1 mg at bedtime.      Hx of HTN  Home Meds:  Aldactone 50 mg tablet daily      Dx: Hx of  Vit D def  Continue Vit D supp    Discharged home in a stable condition. Advised follow up with PCP for further care.   Discharge day management time > 30 minutes.        Pertinent Physical Exam At Time of Discharge:  Vitals:    02/17/25 0524   BP: 138/90   Pulse: 99   Resp: 18   Temp: 36.8 °C (98.2 °F)   SpO2: 94%       Physical Exam  Constitutional:       Appearance: She is obese.   HENT:      Head: Normocephalic.      Nose: Nose normal.      Mouth/Throat:      Mouth: Mucous membranes are moist.   Eyes:      Extraocular Movements: Extraocular movements intact.      Pupils: Pupils are equal, round, and reactive to light.   Cardiovascular:      Rate and Rhythm: Normal rate and regular rhythm.      Heart sounds: Normal heart sounds. No murmur heard.  Pulmonary:      Effort: No respiratory distress.      Breath sounds: Normal breath sounds. No wheezing.   Abdominal:      General: There is no distension.      Palpations: Abdomen is soft.      Tenderness: There is no abdominal tenderness.   Musculoskeletal:         General: Normal range of motion.      Cervical back: Normal range of motion.   Skin:     Comments: Multiple healed abrasions noted on both arms.    Neurological:      General: No focal deficit present.      Mental Status: She is alert and oriented to person, place, and time.   Psychiatric:         Mood and Affect: Mood normal.         Outpatient Follow-Up  No future appointments.      Jonathan Catherine MD

## 2025-02-17 NOTE — PROGRESS NOTES
"SUBJECTIVE  On interview pt describes her mood as \"ok\" and denied any thoughts of suicide. Stated the last time she had thoughts of suicide was on the evening of her ingestion (Thursday 2/13). When asked what had changed since that time pt stated that her ingestion was a reaction to finding out she didn't have insurance and had significant medical debt. She now is in the processing re-applying for coverage and feels a sense of relief about that. She noted her attempt to be impulsive in nature and not planned. She denied presence of guns in the home. Denied stock piles of medications. Was able to list her friends as people she can talk to when feeling overwhelmed. Was less inclined to call the 988 line if friends aren't available, but was open to discussions on when calling 988 may be the best course of action (if friends are busy). She successfully completed a mental health safety plan and listed multiple coping mechanisms including origami, journaling, and listening to music. Noted her friends, pet, and family to be protective factors against suicide. Patient reported she felt safe to return to her home, and did not feel inpatient psychiatric admission was necessary for further stabilization. Voiced that while in the past she had requested/been onboard with inpatient hospitalization after previous suicide attempts, she felt the impulsive nature of this attempt as a response to a specific stressor would not be improved by psychiatric admission.     With pt permission contacted her mother Alicia at [phone]. Pt lives with mother and step father. Mom stated that patient was not making statements about suicide or death prior to the intentional ingestion. Mom noted that patient's attempts are usually impulsive in nature, and pt has never made suicidal statements prior to an attempt. Mom denied access to firearms in the home. Mom stated that she did note pt to have medication bottles in her room, but mom wasn't sure " "how much medication was in them. Mom was receptive to conversations regarding limiting access to lethal means including not having large stockpiles of medications, and disposing of medications that are /no longer needed. Mom did not have any safety concerns for patient returning to the home.   ==========  Additional information:    OBJECTIVE    VITALS      2/15/2025     8:19 PM 2025     4:16 AM 2025    12:46 PM 2025     9:00 PM 2025     5:24 AM 2025    11:31 AM 2025    12:59 PM   Vitals   Systolic 117 109 148 110 138  134   Diastolic 77 67 91 73 90  90   BP Location Right arm   Right arm Right arm  Right arm   Heart Rate 87 77 70 75 99  85   Temp 36.8 °C (98.2 °F) 36.5 °C (97.7 °F)  36.3 °C (97.3 °F) 36.8 °C (98.2 °F)  36.6 °C (97.9 °F)   Resp 16 16  18 18  18   Weight (lb)      463.63    BMI      62.88 kg/m2    BSA (m2)      3.27 m2         Mental Status Exam:  General: Seated comfortably in bed. No acute distress.   Appearance: Appears stated age and Appropriately groomed  Attitude/Behavior: Cooperative, conversant, engaged, and with good eye contact.  Motor Activity: No psychomotor agitation or retardation. No abnormal movements, tremors or tics  Speech: Normal rate, tone and rhythm and coherent speech  Mood: \"Ok\"  Affect:  Largely euthymic, mildly constricted, normal intensity.  Thought Process: Linear, goal directed  Thought Content: Appropriate with no SI or HI  Perception: No perceptual abnormalities noted and Does not appear to be internally stimulated  Cognition: Alert and oriented x4 to person, place, time, and situation  Insight: Fair, in regards to understanding mental health condition  Judgement: Fair    CURRENT MEDICATIONS  Scheduled medications  doxazosin, 1 mg, oral, Nightly  enoxaparin, 60 mg, subcutaneous, q12h [facility]  [START ON 2025] ergocalciferol, 1,250 mcg, oral, Weekly  escitalopram, 5 mg, oral, Daily  influenza, 0.5 mL, intramuscular, During " hospitalization  multivitamin with minerals, 1 tablet, oral, Daily  pantoprazole, 40 mg, oral, Daily before breakfast        Continuous medications       PRN medications  PRN medications: polyethylene glycol     LABS  No results found for this or any previous visit (from the past 24 hours).     IMAGING  No results found.     PSYCHIATRIC RISK ASSESSMENT  Acute Risk of Harm to Others is Considered: Low  Acute Risk of Harm to Self is Considered: Low  Chronic Risk of Harm to Self is Considered: Elevated; pt has hx of multiple suicide attempts, on hx largely impulsive in nature and reactionary to psychosocial stressors. Risk has been mitigated by post-ingestion hospitalization (admitted medically for 4 days), alleviating psychosocial stressor of insurance lapse (has been assisted with medicaid application), safety planning, and discussions of restriction of lethal means with both patient and mother.     ASSESSMENT AND PLAN  Patient is 34 years old (preferred name YARA, pronouns she/her) with a PPHx of Bipolar Disorder, PTSD, Borderline Personality Disorder and PMHx of HTN, Morbid Obesity, who initially presented to [facility] after intentional ingestion of ~60 tablets of Tylenol 500mg. She called her grandmother who then called EMS. Patient later transferred to [facility] for escalated care. UDS negative, received acetylcysteine and activated charcoal. On initial assessment, she reported feeling very overwhelmed with financial concerns as she recently lost her job, as well as insurance issues which she reported as her biggest stressors. She was recently discharged from [facility] ~1.5 weeks ago following OD, and has had ~ 7 psychiatric hospitalizations in this past year. Reports discharge on Lexapro 10mg and Abilify STEIN ([id number]), but has run out of Lexapro about a week ago, and has been having escalating suicidal thoughts since that time as she felt she would not be able to afford the medication further.     2/17/25:  Patient was evaluated today by psychiatry after medical clearance. She voiced resolution of her suicidal ideation, and noted that the specific psychosocial stressor that precipitated event has been addressed. She was able to effectively participate in safety planning with a focus on coping strategies and conversations surrounding limiting access to lethal means. She was future oriented-listing multiple protective factors against suicide. Pt gave permission to contact her mother who denied any safety concerns and was also counseled on home safety. As patient no longer represents an imminent threat to herself or others she does not necessitate inpatient psychiatric admission. Furthermore given hx of impulsive suicidal attempts in reaction to specific stressors, inpatient hospitalization would likely not confer significant benefit to the patient and would be unlikely to modify patient's symptomology. Patient would reap the most benefit from ongoing outpatient care with her established providers ([facility]), and this treatment setting represents the least restrictive appropriate treatment environment. Per pt she has a therapy appointment Wednesday 2/19 and a psychiatry appointment in ~1 week. She reported having enough medication to get her to that appointment.      IMPRESSION  Bipolar 1 Disorder, current episode depressed  Borderline Personality Disorder  PTSD by hx     RECOMMENDATIONS  Safety:  - Patient does not necessitate inpatient psychiatric admission.   - Patient does not require a 1:1 sitter from a psychiatric perspective at this time.  - Defer decision regarding 1:1 to the primary team.      Medications:  Aristada STEIN given in February  Doxazosin 1 mg at bedtime  Resume escitalopram at 5 mg PO daily     Work-up:  - EKG (2/12): QTc of 424  -Acetaminophen 126.8 > 132.5 > 46.1 >10.5   -Glc 117, Na 135, K 3.5  -MCV 77, INR 1.1  -AST 26, ALT 15     Ancillary Services:  - Please offer , pet, and music  therapy consults     Follow-up:  -Patient reports outpatient Psychiatry and Psychotherapy care at [facility]. Has therapy appointment 2/18/25 and next psychiatry appt week of 2/24.       ==========  - Discussed recommendations with primary team.  - Psychiatry will sign off as no longer needing inpatient psychiatry and primary team intends to discharge.     Mikaela Higginbotham MD  Post-Pediatric Portal Fellow in Psychiatry, PGY4

## 2025-02-18 ENCOUNTER — PATIENT OUTREACH (OUTPATIENT)
Dept: CARE COORDINATION | Facility: CLINIC | Age: 35
End: 2025-02-18

## 2025-02-19 LAB
ATRIAL RATE: 70 BPM
ATRIAL RATE: 79 BPM
P AXIS: 30 DEGREES
P OFFSET: 191 MS
P OFFSET: 192 MS
P ONSET: 129 MS
P ONSET: 147 MS
PR INTERVAL: 146 MS
PR INTERVAL: 156 MS
Q ONSET: 207 MS
Q ONSET: 220 MS
QRS COUNT: 12 BEATS
QRS COUNT: 13 BEATS
QRS DURATION: 104 MS
QRS DURATION: 74 MS
QT INTERVAL: 386 MS
QT INTERVAL: 430 MS
QTC CALCULATION(BAZETT): 442 MS
QTC CALCULATION(BAZETT): 464 MS
QTC FREDERICIA: 423 MS
QTC FREDERICIA: 452 MS
R AXIS: -12 DEGREES
R AXIS: -3 DEGREES
T AXIS: 148 DEGREES
T AXIS: 19 DEGREES
T OFFSET: 413 MS
T OFFSET: 422 MS
VENTRICULAR RATE: 70 BPM
VENTRICULAR RATE: 79 BPM

## 2025-03-02 LAB
ATRIAL RATE: 80 BPM
P AXIS: 36 DEGREES
P OFFSET: 202 MS
P ONSET: 138 MS
PR INTERVAL: 150 MS
Q ONSET: 213 MS
QRS COUNT: 13 BEATS
QRS DURATION: 96 MS
QT INTERVAL: 370 MS
QTC CALCULATION(BAZETT): 426 MS
QTC FREDERICIA: 407 MS
R AXIS: 54 DEGREES
T AXIS: 55 DEGREES
T OFFSET: 398 MS
VENTRICULAR RATE: 80 BPM

## 2025-03-03 ENCOUNTER — PATIENT OUTREACH (OUTPATIENT)
Dept: CARE COORDINATION | Facility: CLINIC | Age: 35
End: 2025-03-03

## 2025-03-03 NOTE — PROGRESS NOTES
Outreach call to patient following up on appointment with primary care provider.  Left voicemail. Will continue to follow.  Aleah Shukla RN [facility]  [phone]

## 2025-03-24 ENCOUNTER — PATIENT OUTREACH (OUTPATIENT)
Dept: CARE COORDINATION | Facility: CLINIC | Age: 35
End: 2025-03-24
Payer: COMMERCIAL

## 2025-03-24 NOTE — PROGRESS NOTES
Outreach call to patient to check in 30 days after hospital discharge to support smooth transition of care. No answer.  Aleah Shukla RN [facility] [place]  [phone]